# Patient Record
Sex: MALE | URBAN - METROPOLITAN AREA
[De-identification: names, ages, dates, MRNs, and addresses within clinical notes are randomized per-mention and may not be internally consistent; named-entity substitution may affect disease eponyms.]

---

## 2019-09-06 ENCOUNTER — RECORDS - HEALTHEAST (OUTPATIENT)
Dept: LAB | Facility: CLINIC | Age: 44
End: 2019-09-06

## 2019-09-07 LAB
ALBUMIN SERPL-MCNC: 3.6 G/DL (ref 3.5–5)
ALP SERPL-CCNC: 86 U/L (ref 45–120)
ALT SERPL W P-5'-P-CCNC: 12 U/L (ref 0–45)
ANION GAP SERPL CALCULATED.3IONS-SCNC: 8 MMOL/L (ref 5–18)
AST SERPL W P-5'-P-CCNC: 37 U/L (ref 0–40)
BILIRUB SERPL-MCNC: 1 MG/DL (ref 0–1)
BUN SERPL-MCNC: 12 MG/DL (ref 8–22)
CALCIUM SERPL-MCNC: 8.9 MG/DL (ref 8.5–10.5)
CHLORIDE BLD-SCNC: 100 MMOL/L (ref 98–107)
CHOLEST SERPL-MCNC: 174 MG/DL
CO2 SERPL-SCNC: 27 MMOL/L (ref 22–31)
CREAT SERPL-MCNC: 0.97 MG/DL (ref 0.7–1.3)
FASTING STATUS PATIENT QL REPORTED: ABNORMAL
GFR SERPL CREATININE-BSD FRML MDRD: >60 ML/MIN/1.73M2
GLUCOSE BLD-MCNC: 113 MG/DL (ref 70–125)
HDLC SERPL-MCNC: 75 MG/DL
LDLC SERPL CALC-MCNC: 59 MG/DL
POTASSIUM BLD-SCNC: 3.9 MMOL/L (ref 3.5–5)
PROT SERPL-MCNC: 7.1 G/DL (ref 6–8)
SODIUM SERPL-SCNC: 135 MMOL/L (ref 136–145)
TRIGL SERPL-MCNC: 198 MG/DL

## 2020-08-05 ENCOUNTER — RECORDS - HEALTHEAST (OUTPATIENT)
Dept: LAB | Facility: CLINIC | Age: 45
End: 2020-08-05

## 2020-08-05 LAB
ANION GAP SERPL CALCULATED.3IONS-SCNC: 12 MMOL/L (ref 5–18)
BUN SERPL-MCNC: 5 MG/DL (ref 8–22)
CALCIUM SERPL-MCNC: 10.4 MG/DL (ref 8.5–10.5)
CHLORIDE BLD-SCNC: 97 MMOL/L (ref 98–107)
CHOLEST SERPL-MCNC: 279 MG/DL
CO2 SERPL-SCNC: 27 MMOL/L (ref 22–31)
CREAT SERPL-MCNC: 0.8 MG/DL (ref 0.7–1.3)
FASTING STATUS PATIENT QL REPORTED: ABNORMAL
GFR SERPL CREATININE-BSD FRML MDRD: >60 ML/MIN/1.73M2
GLUCOSE BLD-MCNC: 110 MG/DL (ref 70–125)
HDLC SERPL-MCNC: 126 MG/DL
LDLC SERPL CALC-MCNC: 130 MG/DL
POTASSIUM BLD-SCNC: 4.3 MMOL/L (ref 3.5–5)
SODIUM SERPL-SCNC: 136 MMOL/L (ref 136–145)
TRIGL SERPL-MCNC: 115 MG/DL

## 2021-02-24 ENCOUNTER — OFFICE VISIT - HEALTHEAST (OUTPATIENT)
Dept: FAMILY MEDICINE | Facility: CLINIC | Age: 46
End: 2021-02-24

## 2021-02-24 DIAGNOSIS — F32.A DEPRESSION, UNSPECIFIED DEPRESSION TYPE: ICD-10-CM

## 2021-02-24 DIAGNOSIS — Z28.39 IMMUNIZATION DEFICIENCY: ICD-10-CM

## 2021-02-24 ASSESSMENT — MIFFLIN-ST. JEOR: SCORE: 1445.21

## 2021-02-25 ENCOUNTER — RECORDS - HEALTHEAST (OUTPATIENT)
Dept: LAB | Facility: CLINIC | Age: 46
End: 2021-02-25

## 2021-02-26 LAB
ALBUMIN SERPL-MCNC: 4 G/DL (ref 3.5–5)
ALP SERPL-CCNC: 70 U/L (ref 45–120)
ALT SERPL W P-5'-P-CCNC: 17 U/L (ref 0–45)
ANION GAP SERPL CALCULATED.3IONS-SCNC: 8 MMOL/L (ref 5–18)
AST SERPL W P-5'-P-CCNC: 24 U/L (ref 0–40)
BILIRUB DIRECT SERPL-MCNC: <0.1 MG/DL
BILIRUB SERPL-MCNC: 0.3 MG/DL (ref 0–1)
BUN SERPL-MCNC: 8 MG/DL (ref 8–22)
CALCIUM SERPL-MCNC: 9 MG/DL (ref 8.5–10.5)
CHLORIDE BLD-SCNC: 104 MMOL/L (ref 98–107)
CO2 SERPL-SCNC: 28 MMOL/L (ref 22–31)
CREAT SERPL-MCNC: 0.78 MG/DL (ref 0.7–1.3)
GFR SERPL CREATININE-BSD FRML MDRD: >60 ML/MIN/1.73M2
GLUCOSE BLD-MCNC: 95 MG/DL (ref 70–125)
LIPASE SERPL-CCNC: 80 U/L (ref 0–52)
POTASSIUM BLD-SCNC: 4 MMOL/L (ref 3.5–5)
PROT SERPL-MCNC: 7 G/DL (ref 6–8)
SODIUM SERPL-SCNC: 140 MMOL/L (ref 136–145)

## 2021-04-05 ENCOUNTER — RECORDS - HEALTHEAST (OUTPATIENT)
Dept: LAB | Facility: CLINIC | Age: 46
End: 2021-04-05

## 2021-04-07 LAB — BACTERIA SPEC CULT: NORMAL

## 2021-06-05 VITALS
SYSTOLIC BLOOD PRESSURE: 124 MMHG | HEART RATE: 82 BPM | OXYGEN SATURATION: 99 % | BODY MASS INDEX: 22.02 KG/M2 | WEIGHT: 137 LBS | HEIGHT: 66 IN | DIASTOLIC BLOOD PRESSURE: 80 MMHG | RESPIRATION RATE: 16 BRPM | TEMPERATURE: 98.2 F

## 2021-06-15 NOTE — PROGRESS NOTES
ASSESMENT AND PLAN:  Diagnoses and all orders for this visit:    Depression, unspecified depression type  Counseling done with the patient with the help of a professional .  We discussed options, he would like to start fluoxetine and he would like to establish care here at the clinic and follow-up here.  Return in 6 weeks for recheck, sooner if worsening or problems.  -     FLUoxetine (PROZAC) 20 MG capsule; Take 1 capsule (20 mg total) by mouth daily.  Dispense: 30 capsule; Refill: 11    Immunization deficiency  Green Card/update imm.  Counseling done on immunization history and requirements with the patient.  Reviewed available immunization history and relevant lab records with patient and family.  Immunizations given as documented in the EHR and on form.  Acetaminophen as needed for post-immunization fever or myalgias.  [employer] form I-693 completed today with the patient.  Given to patient in a sealed labeled envelope and a copy is being scanned into the EHR.  Please see that form for further details.  Patient directed to follow-up in clinic for routine and preventative care.         Reviewed the risks and benefits of the treatment plan with the patient and/or caregiver and we discussed indications for routine and emergent follow-up.        SUBJECTIVE: Patient is new.  Previously he had apparently gotten refugee screening done at [facility].  Has not been in for recent follow-up or doctor appointments.  He is here for green card exam today but reports that he has been struggling with depression.  Patient reports increased anxiety and depressed mood over the past several months.  It is made it more difficult for him to enjoy things in his daily life and look forward to things and he feels less motivated.  He is more easily made to feel anxious by stressful things.  No recent fevers.  No history of immunization reactions or problems.    No past medical history on  "file.  There is no problem list on file for this patient.    Current Outpatient Medications   Medication Sig Dispense Refill     FLUoxetine (PROZAC) 20 MG capsule Take 1 capsule (20 mg total) by mouth daily. 30 capsule 11     No current facility-administered medications for this visit.      Social History     Tobacco Use   Smoking Status Current Every Day Smoker     Packs/day: 0.25   Smokeless Tobacco Never Used       OBJECTICE: /80 (Patient Site: Left Arm)   Pulse 82   Temp 98.2  F (36.8  C) (Oral)   Resp 16   Ht 5' 5.75\" (1.67 m)   Wt 137 lb (62.1 kg)   SpO2 99%   BMI 22.28 kg/m       No results found for this or any previous visit (from the past 24 hour(s)).     GEN-Alert, appropriate, in no apparent distress   CV-regular rate and rhythm with no murmur   RESP-lungs clear to auscultation   Psychiatric-appearance well-groomed, speech of normal fluency and rate, mood depressed, affect flat, thought content negative for suicidal or homicidal ideation, thought processing negative for paranoid or delusional thinking.    Tadeo Forrester          "

## 2022-01-01 ENCOUNTER — APPOINTMENT (OUTPATIENT)
Dept: CARDIOLOGY | Facility: CLINIC | Age: 47
End: 2022-01-01
Attending: NEUROLOGICAL SURGERY
Payer: COMMERCIAL

## 2022-01-01 ENCOUNTER — APPOINTMENT (OUTPATIENT)
Dept: CT IMAGING | Facility: CLINIC | Age: 47
End: 2022-01-01
Attending: NEUROLOGICAL SURGERY
Payer: COMMERCIAL

## 2022-01-01 ENCOUNTER — ANESTHESIA EVENT (OUTPATIENT)
Dept: SURGERY | Facility: CLINIC | Age: 47
End: 2022-01-01
Payer: COMMERCIAL

## 2022-01-01 ENCOUNTER — HOSPITAL ENCOUNTER (EMERGENCY)
Facility: HOSPITAL | Age: 47
Discharge: ANOTHER HOSPITAL | End: 2022-07-06
Attending: EMERGENCY MEDICINE | Admitting: EMERGENCY MEDICINE
Payer: COMMERCIAL

## 2022-01-01 ENCOUNTER — HOSPITAL ENCOUNTER (INPATIENT)
Facility: CLINIC | Age: 47
LOS: 1 days | End: 2022-07-07
Attending: NEUROLOGICAL SURGERY | Admitting: NEUROLOGICAL SURGERY
Payer: COMMERCIAL

## 2022-01-01 ENCOUNTER — APPOINTMENT (OUTPATIENT)
Dept: CT IMAGING | Facility: HOSPITAL | Age: 47
End: 2022-01-01
Attending: EMERGENCY MEDICINE
Payer: COMMERCIAL

## 2022-01-01 ENCOUNTER — APPOINTMENT (OUTPATIENT)
Dept: GENERAL RADIOLOGY | Facility: CLINIC | Age: 47
End: 2022-01-01
Attending: NEUROLOGICAL SURGERY
Payer: COMMERCIAL

## 2022-01-01 ENCOUNTER — ANESTHESIA (OUTPATIENT)
Dept: SURGERY | Facility: CLINIC | Age: 47
End: 2022-01-01
Payer: COMMERCIAL

## 2022-01-01 VITALS
TEMPERATURE: 100.7 F | SYSTOLIC BLOOD PRESSURE: 120 MMHG | HEIGHT: 65 IN | WEIGHT: 125.22 LBS | OXYGEN SATURATION: 100 % | HEART RATE: 117 BPM | DIASTOLIC BLOOD PRESSURE: 89 MMHG | BODY MASS INDEX: 20.86 KG/M2 | RESPIRATION RATE: 20 BRPM

## 2022-01-01 VITALS
DIASTOLIC BLOOD PRESSURE: 104 MMHG | SYSTOLIC BLOOD PRESSURE: 189 MMHG | OXYGEN SATURATION: 100 % | WEIGHT: 132.28 LBS | HEART RATE: 91 BPM | BODY MASS INDEX: 21.51 KG/M2 | RESPIRATION RATE: 16 BRPM

## 2022-01-01 DIAGNOSIS — I62.9 INTRACRANIAL HEMORRHAGE (H): ICD-10-CM

## 2022-01-01 LAB
ABO/RH(D): ABNORMAL
ALBUMIN SERPL BCG-MCNC: 4.3 G/DL (ref 3.5–5.2)
ALBUMIN UR-MCNC: NEGATIVE MG/DL
ALP SERPL-CCNC: 94 U/L (ref 40–129)
ALT SERPL W P-5'-P-CCNC: 45 U/L (ref 10–50)
ANION GAP SERPL CALCULATED.3IONS-SCNC: 12 MMOL/L (ref 7–15)
ANION GAP SERPL CALCULATED.3IONS-SCNC: 21 MMOL/L (ref 5–18)
ANION GAP SERPL CALCULATED.3IONS-SCNC: 22 MMOL/L (ref 7–15)
ANTIBODY ID: NORMAL
ANTIBODY SCREEN: POSITIVE
APPEARANCE UR: CLEAR
APTT PPP: 27 SECONDS (ref 22–38)
AST SERPL W P-5'-P-CCNC: 236 U/L (ref 10–50)
ATRIAL RATE - MUSE: 100 BPM
BASE EXCESS BLDA CALC-SCNC: -0.9 MMOL/L (ref -9–1.8)
BASOPHILS # BLD AUTO: 0.1 10E3/UL (ref 0–0.2)
BASOPHILS NFR BLD AUTO: 1 %
BILIRUB DIRECT SERPL-MCNC: 0.67 MG/DL (ref 0–0.3)
BILIRUB SERPL-MCNC: 1.5 MG/DL
BILIRUB UR QL STRIP: NEGATIVE
BUN SERPL-MCNC: 5.6 MG/DL (ref 6–20)
BUN SERPL-MCNC: 5.7 MG/DL (ref 6–20)
BUN SERPL-MCNC: 6 MG/DL (ref 8–22)
CALCIUM SERPL-MCNC: 8.5 MG/DL (ref 8.6–10)
CALCIUM SERPL-MCNC: 8.7 MG/DL (ref 8.5–10.5)
CALCIUM SERPL-MCNC: 8.9 MG/DL (ref 8.6–10)
CHLORIDE BLD-SCNC: 94 MMOL/L (ref 98–107)
CHLORIDE SERPL-SCNC: 93 MMOL/L (ref 98–107)
CHLORIDE SERPL-SCNC: 97 MMOL/L (ref 98–107)
CK SERPL-CCNC: 354 U/L (ref 39–308)
CO2 SERPL-SCNC: 21 MMOL/L (ref 22–31)
COLOR UR AUTO: ABNORMAL
CREAT SERPL-MCNC: 0.63 MG/DL (ref 0.67–1.17)
CREAT SERPL-MCNC: 0.64 MG/DL (ref 0.67–1.17)
CREAT SERPL-MCNC: 0.69 MG/DL (ref 0.7–1.3)
DEPRECATED HCO3 PLAS-SCNC: 21 MMOL/L (ref 22–29)
DEPRECATED HCO3 PLAS-SCNC: 27 MMOL/L (ref 22–29)
DIASTOLIC BLOOD PRESSURE - MUSE: NORMAL MMHG
EOSINOPHIL # BLD AUTO: 0.1 10E3/UL (ref 0–0.7)
EOSINOPHIL NFR BLD AUTO: 1 %
ERYTHROCYTE [DISTWIDTH] IN BLOOD BY AUTOMATED COUNT: 14.7 % (ref 10–15)
ERYTHROCYTE [DISTWIDTH] IN BLOOD BY AUTOMATED COUNT: 14.7 % (ref 10–15)
GFR SERPL CREATININE-BSD FRML MDRD: >90 ML/MIN/1.73M2
GLUCOSE BLD-MCNC: 154 MG/DL (ref 70–125)
GLUCOSE BLDC GLUCOMTR-MCNC: 146 MG/DL (ref 70–99)
GLUCOSE BLDC GLUCOMTR-MCNC: 155 MG/DL (ref 70–99)
GLUCOSE SERPL-MCNC: 148 MG/DL (ref 70–99)
GLUCOSE SERPL-MCNC: 156 MG/DL (ref 70–99)
GLUCOSE UR STRIP-MCNC: NEGATIVE MG/DL
HCO3 BLD-SCNC: 25 MMOL/L (ref 21–28)
HCT VFR BLD AUTO: 35.7 % (ref 40–53)
HCT VFR BLD AUTO: 37.8 % (ref 40–53)
HGB BLD-MCNC: 12.4 G/DL (ref 13.3–17.7)
HGB BLD-MCNC: 13.2 G/DL (ref 13.3–17.7)
HGB UR QL STRIP: NEGATIVE
HOLD SPECIMEN: NORMAL
IMM GRANULOCYTES # BLD: 0 10E3/UL
IMM GRANULOCYTES NFR BLD: 1 %
INR PPP: 1.15 (ref 0.85–1.15)
INTERPRETATION ECG - MUSE: NORMAL
KETONES UR STRIP-MCNC: 10 MG/DL
LACTATE SERPL-SCNC: 6.2 MMOL/L (ref 0.7–2)
LEUKOCYTE ESTERASE UR QL STRIP: NEGATIVE
LVEF ECHO: NORMAL
LYMPHOCYTES # BLD AUTO: 2.5 10E3/UL (ref 0.8–5.3)
LYMPHOCYTES NFR BLD AUTO: 30 %
MAGNESIUM SERPL-MCNC: 1.3 MG/DL (ref 1.7–2.3)
MCH RBC QN AUTO: 33.3 PG (ref 26.5–33)
MCH RBC QN AUTO: 33.9 PG (ref 26.5–33)
MCHC RBC AUTO-ENTMCNC: 34.7 G/DL (ref 31.5–36.5)
MCHC RBC AUTO-ENTMCNC: 34.9 G/DL (ref 31.5–36.5)
MCV RBC AUTO: 96 FL (ref 78–100)
MCV RBC AUTO: 97 FL (ref 78–100)
MONOCYTES # BLD AUTO: 0.8 10E3/UL (ref 0–1.3)
MONOCYTES NFR BLD AUTO: 9 %
N AG RBC QL: NEGATIVE
NEUTROPHILS # BLD AUTO: 4.9 10E3/UL (ref 1.6–8.3)
NEUTROPHILS NFR BLD AUTO: 58 %
NITRATE UR QL: NEGATIVE
NRBC # BLD AUTO: 0 10E3/UL
NRBC BLD AUTO-RTO: 0 /100
O2/TOTAL GAS SETTING VFR VENT: 30 %
OXYHGB MFR BLD: 98 % (ref 92–100)
P AXIS - MUSE: 70 DEGREES
PCO2 BLD: 42 MM HG (ref 35–45)
PH BLD: 7.37 [PH] (ref 7.35–7.45)
PH UR STRIP: 6.5 [PH] (ref 5–7)
PHOSPHATE SERPL-MCNC: 3 MG/DL (ref 2.5–4.5)
PLATELET # BLD AUTO: 64 10E3/UL (ref 150–450)
PLATELET # BLD AUTO: 65 10E3/UL (ref 150–450)
PO2 BLD: 151 MM HG (ref 80–105)
POTASSIUM BLD-SCNC: 3.6 MMOL/L (ref 3.5–5)
POTASSIUM SERPL-SCNC: 3.3 MMOL/L (ref 3.4–5.3)
POTASSIUM SERPL-SCNC: 4.3 MMOL/L (ref 3.4–5.3)
PR INTERVAL - MUSE: 122 MS
PROT SERPL-MCNC: 6.9 G/DL (ref 6.4–8.3)
QRS DURATION - MUSE: 112 MS
QT - MUSE: 388 MS
QTC - MUSE: 500 MS
R AXIS - MUSE: 75 DEGREES
RBC # BLD AUTO: 3.72 10E6/UL (ref 4.4–5.9)
RBC # BLD AUTO: 3.89 10E6/UL (ref 4.4–5.9)
SODIUM SERPL-SCNC: 136 MMOL/L (ref 136–145)
SP GR UR STRIP: 1.03 (ref 1–1.03)
SPECIMEN EXPIRATION DATE: ABNORMAL
SPECIMEN EXPIRATION DATE: NORMAL
SPECIMEN EXPIRATION DATE: NORMAL
SYSTOLIC BLOOD PRESSURE - MUSE: NORMAL MMHG
T AXIS - MUSE: 210 DEGREES
TROPONIN I SERPL-MCNC: <0.01 NG/ML (ref 0–0.29)
TROPONIN T SERPL HS-MCNC: 8 NG/L
UROBILINOGEN UR STRIP-MCNC: NORMAL MG/DL
VENTRICULAR RATE- MUSE: 100 BPM
WBC # BLD AUTO: 8.4 10E3/UL (ref 4–11)
WBC # BLD AUTO: 9.5 10E3/UL (ref 4–11)

## 2022-01-01 PROCEDURE — 360N000078 HC SURGERY LEVEL 5, PER MIN: Performed by: NEUROLOGICAL SURGERY

## 2022-01-01 PROCEDURE — 94002 VENT MGMT INPAT INIT DAY: CPT

## 2022-01-01 PROCEDURE — 93306 TTE W/DOPPLER COMPLETE: CPT | Mod: 26 | Performed by: STUDENT IN AN ORGANIZED HEALTH CARE EDUCATION/TRAINING PROGRAM

## 2022-01-01 PROCEDURE — 70450 CT HEAD/BRAIN W/O DYE: CPT

## 2022-01-01 PROCEDURE — 81003 URINALYSIS AUTO W/O SCOPE: CPT

## 2022-01-01 PROCEDURE — 250N000009 HC RX 250: Performed by: EMERGENCY MEDICINE

## 2022-01-01 PROCEDURE — 85027 COMPLETE CBC AUTOMATED: CPT

## 2022-01-01 PROCEDURE — 82805 BLOOD GASES W/O2 SATURATION: CPT

## 2022-01-01 PROCEDURE — 250N000011 HC RX IP 250 OP 636

## 2022-01-01 PROCEDURE — 999N000065 XR CHEST PORT 1 VIEW

## 2022-01-01 PROCEDURE — 36415 COLL VENOUS BLD VENIPUNCTURE: CPT | Performed by: REGISTERED NURSE

## 2022-01-01 PROCEDURE — 99291 CRITICAL CARE FIRST HOUR: CPT | Performed by: NURSE PRACTITIONER

## 2022-01-01 PROCEDURE — 370N000017 HC ANESTHESIA TECHNICAL FEE, PER MIN: Performed by: NEUROLOGICAL SURGERY

## 2022-01-01 PROCEDURE — 94003 VENT MGMT INPAT SUBQ DAY: CPT

## 2022-01-01 PROCEDURE — 61210 BURR HOLE IMPLT VENTR CATH: CPT | Mod: GC | Performed by: NEUROLOGICAL SURGERY

## 2022-01-01 PROCEDURE — 999N000158 HC STATISTIC RCP TIME ED VENT EA 10 MIN

## 2022-01-01 PROCEDURE — 96365 THER/PROPH/DIAG IV INF INIT: CPT | Mod: 59

## 2022-01-01 PROCEDURE — 999N000076 HC STATISTIC ICP MONITORING

## 2022-01-01 PROCEDURE — 250N000011 HC RX IP 250 OP 636: Performed by: REGISTERED NURSE

## 2022-01-01 PROCEDURE — 250N000009 HC RX 250: Performed by: REGISTERED NURSE

## 2022-01-01 PROCEDURE — 009630Z DRAINAGE OF CEREBRAL VENTRICLE WITH DRAINAGE DEVICE, PERCUTANEOUS APPROACH: ICD-10-PCS | Performed by: NEUROLOGICAL SURGERY

## 2022-01-01 PROCEDURE — 85610 PROTHROMBIN TIME: CPT | Performed by: EMERGENCY MEDICINE

## 2022-01-01 PROCEDURE — 70496 CT ANGIOGRAPHY HEAD: CPT

## 2022-01-01 PROCEDURE — 85025 COMPLETE CBC W/AUTO DIFF WBC: CPT | Performed by: EMERGENCY MEDICINE

## 2022-01-01 PROCEDURE — 84100 ASSAY OF PHOSPHORUS: CPT

## 2022-01-01 PROCEDURE — 80053 COMPREHEN METABOLIC PANEL: CPT | Performed by: EMERGENCY MEDICINE

## 2022-01-01 PROCEDURE — 250N000011 HC RX IP 250 OP 636: Performed by: NURSE PRACTITIONER

## 2022-01-01 PROCEDURE — 250N000011 HC RX IP 250 OP 636: Performed by: NURSE ANESTHETIST, CERTIFIED REGISTERED

## 2022-01-01 PROCEDURE — 82248 BILIRUBIN DIRECT: CPT

## 2022-01-01 PROCEDURE — 86905 BLOOD TYPING RBC ANTIGENS: CPT | Performed by: REGISTERED NURSE

## 2022-01-01 PROCEDURE — 36415 COLL VENOUS BLD VENIPUNCTURE: CPT | Performed by: EMERGENCY MEDICINE

## 2022-01-01 PROCEDURE — 250N000009 HC RX 250: Performed by: NEUROLOGICAL SURGERY

## 2022-01-01 PROCEDURE — 272N000001 HC OR GENERAL SUPPLY STERILE: Performed by: NEUROLOGICAL SURGERY

## 2022-01-01 PROCEDURE — 84484 ASSAY OF TROPONIN QUANT: CPT

## 2022-01-01 PROCEDURE — 96368 THER/DIAG CONCURRENT INF: CPT

## 2022-01-01 PROCEDURE — 5A1935Z RESPIRATORY VENTILATION, LESS THAN 24 CONSECUTIVE HOURS: ICD-10-PCS | Performed by: NEUROLOGICAL SURGERY

## 2022-01-01 PROCEDURE — 70450 CT HEAD/BRAIN W/O DYE: CPT | Mod: 26 | Performed by: STUDENT IN AN ORGANIZED HEALTH CARE EDUCATION/TRAINING PROGRAM

## 2022-01-01 PROCEDURE — 250N000013 HC RX MED GY IP 250 OP 250 PS 637: Performed by: INTERNAL MEDICINE

## 2022-01-01 PROCEDURE — 250N000011 HC RX IP 250 OP 636: Performed by: EMERGENCY MEDICINE

## 2022-01-01 PROCEDURE — 99222 1ST HOSP IP/OBS MODERATE 55: CPT | Mod: GC | Performed by: INTERNAL MEDICINE

## 2022-01-01 PROCEDURE — 999N000157 HC STATISTIC RCP TIME EA 10 MIN

## 2022-01-01 PROCEDURE — 85730 THROMBOPLASTIN TIME PARTIAL: CPT | Performed by: EMERGENCY MEDICINE

## 2022-01-01 PROCEDURE — 83605 ASSAY OF LACTIC ACID: CPT | Performed by: ANESTHESIOLOGY

## 2022-01-01 PROCEDURE — 99285 EMERGENCY DEPT VISIT HI MDM: CPT | Mod: 25

## 2022-01-01 PROCEDURE — 82550 ASSAY OF CK (CPK): CPT

## 2022-01-01 PROCEDURE — 93306 TTE W/DOPPLER COMPLETE: CPT

## 2022-01-01 PROCEDURE — 250N000011 HC RX IP 250 OP 636: Performed by: ANESTHESIOLOGY

## 2022-01-01 PROCEDURE — 93010 ELECTROCARDIOGRAM REPORT: CPT | Performed by: INTERNAL MEDICINE

## 2022-01-01 PROCEDURE — 258N000003 HC RX IP 258 OP 636: Performed by: REGISTERED NURSE

## 2022-01-01 PROCEDURE — 250N000011 HC RX IP 250 OP 636: Performed by: PSYCHIATRY & NEUROLOGY

## 2022-01-01 PROCEDURE — 200N000002 HC R&B ICU UMMC

## 2022-01-01 PROCEDURE — 84484 ASSAY OF TROPONIN QUANT: CPT | Performed by: EMERGENCY MEDICINE

## 2022-01-01 PROCEDURE — 31500 INSERT EMERGENCY AIRWAY: CPT

## 2022-01-01 PROCEDURE — 258N000003 HC RX IP 258 OP 636: Performed by: EMERGENCY MEDICINE

## 2022-01-01 PROCEDURE — 86870 RBC ANTIBODY IDENTIFICATION: CPT | Performed by: REGISTERED NURSE

## 2022-01-01 PROCEDURE — 86901 BLOOD TYPING SEROLOGIC RH(D): CPT | Performed by: REGISTERED NURSE

## 2022-01-01 PROCEDURE — 80048 BASIC METABOLIC PNL TOTAL CA: CPT | Performed by: NEUROLOGICAL SURGERY

## 2022-01-01 PROCEDURE — 250N000025 HC SEVOFLURANE, PER MIN: Performed by: NEUROLOGICAL SURGERY

## 2022-01-01 PROCEDURE — 93005 ELECTROCARDIOGRAM TRACING: CPT

## 2022-01-01 PROCEDURE — 999N000015 HC STATISTIC ARTERIAL MONITORING DAILY

## 2022-01-01 PROCEDURE — 83735 ASSAY OF MAGNESIUM: CPT

## 2022-01-01 PROCEDURE — 71045 X-RAY EXAM CHEST 1 VIEW: CPT | Mod: 26 | Performed by: RADIOLOGY

## 2022-01-01 PROCEDURE — 250N000009 HC RX 250

## 2022-01-01 RX ORDER — FENTANYL CITRATE 50 UG/ML
50 INJECTION, SOLUTION INTRAMUSCULAR; INTRAVENOUS EVERY 30 MIN PRN
Status: DISCONTINUED | OUTPATIENT
Start: 2022-01-01 | End: 2022-01-01

## 2022-01-01 RX ORDER — SODIUM CHLORIDE, SODIUM GLUCONATE, SODIUM ACETATE, POTASSIUM CHLORIDE AND MAGNESIUM CHLORIDE 526; 502; 368; 37; 30 MG/100ML; MG/100ML; MG/100ML; MG/100ML; MG/100ML
125 INJECTION, SOLUTION INTRAVENOUS CONTINUOUS
Status: DISCONTINUED | OUTPATIENT
Start: 2022-01-01 | End: 2022-01-01

## 2022-01-01 RX ORDER — LABETALOL HYDROCHLORIDE 5 MG/ML
10-40 INJECTION, SOLUTION INTRAVENOUS EVERY 10 MIN PRN
Status: DISCONTINUED | OUTPATIENT
Start: 2022-01-01 | End: 2022-01-01 | Stop reason: HOSPADM

## 2022-01-01 RX ORDER — MORPHINE SULFATE 20 MG/ML
5 SOLUTION ORAL EVERY 4 HOURS
Status: DISCONTINUED | OUTPATIENT
Start: 2022-01-01 | End: 2022-01-01 | Stop reason: HOSPADM

## 2022-01-01 RX ORDER — ONDANSETRON 2 MG/ML
INJECTION INTRAMUSCULAR; INTRAVENOUS PRN
Status: DISCONTINUED | OUTPATIENT
Start: 2022-01-01 | End: 2022-01-01

## 2022-01-01 RX ORDER — MANNITOL 20 G/100ML
INJECTION, SOLUTION INTRAVENOUS PRN
Status: DISCONTINUED | OUTPATIENT
Start: 2022-01-01 | End: 2022-01-01

## 2022-01-01 RX ORDER — ONDANSETRON 2 MG/ML
4 INJECTION INTRAMUSCULAR; INTRAVENOUS EVERY 6 HOURS PRN
Status: DISCONTINUED | OUTPATIENT
Start: 2022-01-01 | End: 2022-01-01 | Stop reason: HOSPADM

## 2022-01-01 RX ORDER — NALOXONE HYDROCHLORIDE 0.4 MG/ML
0.2 INJECTION, SOLUTION INTRAMUSCULAR; INTRAVENOUS; SUBCUTANEOUS
Status: DISCONTINUED | OUTPATIENT
Start: 2022-01-01 | End: 2022-01-01 | Stop reason: HOSPADM

## 2022-01-01 RX ORDER — LORAZEPAM 2 MG/ML
1-2 CONCENTRATE ORAL
Status: DISCONTINUED | OUTPATIENT
Start: 2022-01-01 | End: 2022-01-01 | Stop reason: HOSPADM

## 2022-01-01 RX ORDER — FLUMAZENIL 0.1 MG/ML
0.2 INJECTION, SOLUTION INTRAVENOUS
Status: DISCONTINUED | OUTPATIENT
Start: 2022-01-01 | End: 2022-01-01 | Stop reason: HOSPADM

## 2022-01-01 RX ORDER — HYDROMORPHONE HCL IN WATER/PF 6 MG/30 ML
0.2 PATIENT CONTROLLED ANALGESIA SYRINGE INTRAVENOUS
Status: DISCONTINUED | OUTPATIENT
Start: 2022-01-01 | End: 2022-01-01

## 2022-01-01 RX ORDER — CEFAZOLIN SODIUM/WATER 2 G/20 ML
SYRINGE (ML) INTRAVENOUS PRN
Status: DISCONTINUED | OUTPATIENT
Start: 2022-01-01 | End: 2022-01-01

## 2022-01-01 RX ORDER — IOPAMIDOL 755 MG/ML
75 INJECTION, SOLUTION INTRAVASCULAR ONCE
Status: COMPLETED | OUTPATIENT
Start: 2022-01-01 | End: 2022-01-01

## 2022-01-01 RX ORDER — PROCHLORPERAZINE MALEATE 10 MG
10 TABLET ORAL EVERY 6 HOURS PRN
Status: DISCONTINUED | OUTPATIENT
Start: 2022-01-01 | End: 2022-01-01 | Stop reason: HOSPADM

## 2022-01-01 RX ORDER — NALOXONE HYDROCHLORIDE 0.4 MG/ML
0.4 INJECTION, SOLUTION INTRAMUSCULAR; INTRAVENOUS; SUBCUTANEOUS
Status: DISCONTINUED | OUTPATIENT
Start: 2022-01-01 | End: 2022-01-01

## 2022-01-01 RX ORDER — ACETAMINOPHEN 325 MG/1
975 TABLET ORAL EVERY 8 HOURS
Status: DISCONTINUED | OUTPATIENT
Start: 2022-01-01 | End: 2022-01-01

## 2022-01-01 RX ORDER — SENNOSIDES 8.6 MG
8.6 TABLET ORAL 2 TIMES DAILY PRN
Status: DISCONTINUED | OUTPATIENT
Start: 2022-01-01 | End: 2022-01-01 | Stop reason: HOSPADM

## 2022-01-01 RX ORDER — HYDROMORPHONE HCL IN WATER/PF 6 MG/30 ML
0.4 PATIENT CONTROLLED ANALGESIA SYRINGE INTRAVENOUS
Status: DISCONTINUED | OUTPATIENT
Start: 2022-01-01 | End: 2022-01-01

## 2022-01-01 RX ORDER — LIDOCAINE 40 MG/G
CREAM TOPICAL
Status: DISCONTINUED | OUTPATIENT
Start: 2022-01-01 | End: 2022-01-01 | Stop reason: HOSPADM

## 2022-01-01 RX ORDER — MORPHINE SULFATE 2 MG/ML
2-4 INJECTION, SOLUTION INTRAMUSCULAR; INTRAVENOUS
Status: DISCONTINUED | OUTPATIENT
Start: 2022-01-01 | End: 2022-01-01

## 2022-01-01 RX ORDER — POLYETHYLENE GLYCOL 3350 17 G/17G
17 POWDER, FOR SOLUTION ORAL DAILY
Status: DISCONTINUED | OUTPATIENT
Start: 2022-01-01 | End: 2022-01-01

## 2022-01-01 RX ORDER — LIDOCAINE HYDROCHLORIDE AND EPINEPHRINE 10; 10 MG/ML; UG/ML
INJECTION, SOLUTION INFILTRATION; PERINEURAL PRN
Status: DISCONTINUED | OUTPATIENT
Start: 2022-01-01 | End: 2022-01-01 | Stop reason: HOSPADM

## 2022-01-01 RX ORDER — PROPOFOL 10 MG/ML
INJECTION, EMULSION INTRAVENOUS CONTINUOUS PRN
Status: DISCONTINUED | OUTPATIENT
Start: 2022-01-01 | End: 2022-01-01

## 2022-01-01 RX ORDER — NALOXONE HYDROCHLORIDE 0.4 MG/ML
0.2 INJECTION, SOLUTION INTRAMUSCULAR; INTRAVENOUS; SUBCUTANEOUS
Status: DISCONTINUED | OUTPATIENT
Start: 2022-01-01 | End: 2022-01-01

## 2022-01-01 RX ORDER — FAMOTIDINE 20 MG/1
20 TABLET, FILM COATED ORAL 2 TIMES DAILY
Status: DISCONTINUED | OUTPATIENT
Start: 2022-01-01 | End: 2022-01-01

## 2022-01-01 RX ORDER — HYDRALAZINE HYDROCHLORIDE 20 MG/ML
10-20 INJECTION INTRAMUSCULAR; INTRAVENOUS EVERY 30 MIN PRN
Status: DISCONTINUED | OUTPATIENT
Start: 2022-01-01 | End: 2022-01-01 | Stop reason: HOSPADM

## 2022-01-01 RX ORDER — NALOXONE HYDROCHLORIDE 0.4 MG/ML
0.4 INJECTION, SOLUTION INTRAMUSCULAR; INTRAVENOUS; SUBCUTANEOUS
Status: DISCONTINUED | OUTPATIENT
Start: 2022-01-01 | End: 2022-01-01 | Stop reason: HOSPADM

## 2022-01-01 RX ORDER — PROPOFOL 10 MG/ML
5-75 INJECTION, EMULSION INTRAVENOUS CONTINUOUS
Status: DISCONTINUED | OUTPATIENT
Start: 2022-01-01 | End: 2022-01-01 | Stop reason: HOSPADM

## 2022-01-01 RX ORDER — MORPHINE SULFATE 20 MG/ML
5-10 SOLUTION ORAL
Status: DISCONTINUED | OUTPATIENT
Start: 2022-01-01 | End: 2022-01-01

## 2022-01-01 RX ORDER — FENTANYL CITRATE 50 UG/ML
INJECTION, SOLUTION INTRAMUSCULAR; INTRAVENOUS PRN
Status: DISCONTINUED | OUTPATIENT
Start: 2022-01-01 | End: 2022-01-01

## 2022-01-01 RX ORDER — ETOMIDATE 2 MG/ML
20 INJECTION INTRAVENOUS ONCE
Status: COMPLETED | OUTPATIENT
Start: 2022-01-01 | End: 2022-01-01

## 2022-01-01 RX ORDER — MORPHINE SULFATE 2 MG/ML
2-4 INJECTION, SOLUTION INTRAMUSCULAR; INTRAVENOUS
Status: DISCONTINUED | OUTPATIENT
Start: 2022-01-01 | End: 2022-01-01 | Stop reason: HOSPADM

## 2022-01-01 RX ORDER — MORPHINE SULFATE 20 MG/ML
5-10 SOLUTION ORAL
Status: DISCONTINUED | OUTPATIENT
Start: 2022-01-01 | End: 2022-01-01 | Stop reason: HOSPADM

## 2022-01-01 RX ORDER — LORAZEPAM 2 MG/ML
1-2 INJECTION INTRAMUSCULAR
Status: DISCONTINUED | OUTPATIENT
Start: 2022-01-01 | End: 2022-01-01

## 2022-01-01 RX ORDER — LORAZEPAM 2 MG/ML
1-2 CONCENTRATE ORAL
Status: DISCONTINUED | OUTPATIENT
Start: 2022-01-01 | End: 2022-01-01

## 2022-01-01 RX ORDER — AMOXICILLIN 250 MG
1 CAPSULE ORAL 2 TIMES DAILY
Status: DISCONTINUED | OUTPATIENT
Start: 2022-01-01 | End: 2022-01-01

## 2022-01-01 RX ORDER — ACETAMINOPHEN 325 MG/1
650 TABLET ORAL EVERY 4 HOURS PRN
Status: DISCONTINUED | OUTPATIENT
Start: 2022-07-09 | End: 2022-01-01 | Stop reason: HOSPADM

## 2022-01-01 RX ORDER — PROPOFOL 10 MG/ML
5-75 INJECTION, EMULSION INTRAVENOUS CONTINUOUS
Status: DISCONTINUED | OUTPATIENT
Start: 2022-01-01 | End: 2022-01-01

## 2022-01-01 RX ORDER — PROPOFOL 10 MG/ML
INJECTION, EMULSION INTRAVENOUS PRN
Status: DISCONTINUED | OUTPATIENT
Start: 2022-01-01 | End: 2022-01-01

## 2022-01-01 RX ORDER — SODIUM CHLORIDE, SODIUM LACTATE, POTASSIUM CHLORIDE, CALCIUM CHLORIDE 600; 310; 30; 20 MG/100ML; MG/100ML; MG/100ML; MG/100ML
INJECTION, SOLUTION INTRAVENOUS CONTINUOUS PRN
Status: DISCONTINUED | OUTPATIENT
Start: 2022-01-01 | End: 2022-01-01

## 2022-01-01 RX ORDER — ONDANSETRON 4 MG/1
4 TABLET, ORALLY DISINTEGRATING ORAL EVERY 6 HOURS PRN
Status: DISCONTINUED | OUTPATIENT
Start: 2022-01-01 | End: 2022-01-01 | Stop reason: HOSPADM

## 2022-01-01 RX ORDER — FENTANYL CITRATE 50 UG/ML
50 INJECTION, SOLUTION INTRAMUSCULAR; INTRAVENOUS ONCE
Status: COMPLETED | OUTPATIENT
Start: 2022-01-01 | End: 2022-01-01

## 2022-01-01 RX ORDER — FENTANYL CITRATE 50 UG/ML
50 INJECTION, SOLUTION INTRAMUSCULAR; INTRAVENOUS EVERY 10 MIN PRN
Status: DISCONTINUED | OUTPATIENT
Start: 2022-01-01 | End: 2022-01-01

## 2022-01-01 RX ORDER — SODIUM CHLORIDE 9 MG/ML
INJECTION, SOLUTION INTRAVENOUS CONTINUOUS
Status: DISCONTINUED | OUTPATIENT
Start: 2022-01-01 | End: 2022-01-01

## 2022-01-01 RX ORDER — BISACODYL 10 MG
10 SUPPOSITORY, RECTAL RECTAL DAILY PRN
Status: DISCONTINUED | OUTPATIENT
Start: 2022-01-01 | End: 2022-01-01 | Stop reason: HOSPADM

## 2022-01-01 RX ADMIN — HYDROMORPHONE HYDROCHLORIDE 0.4 MG: 0.2 INJECTION, SOLUTION INTRAMUSCULAR; INTRAVENOUS; SUBCUTANEOUS at 08:33

## 2022-01-01 RX ADMIN — LEVETIRACETAM 1000 MG: 100 INJECTION, SOLUTION INTRAVENOUS at 14:07

## 2022-01-01 RX ADMIN — SODIUM CHLORIDE, SODIUM GLUCONATE, SODIUM ACETATE, POTASSIUM CHLORIDE AND MAGNESIUM CHLORIDE 125 ML: 526; 502; 368; 37; 30 INJECTION, SOLUTION INTRAVENOUS at 20:59

## 2022-01-01 RX ADMIN — Medication 2 G: at 15:17

## 2022-01-01 RX ADMIN — Medication 50 MG: at 15:08

## 2022-01-01 RX ADMIN — FENTANYL CITRATE 50 MCG: 50 INJECTION, SOLUTION INTRAMUSCULAR; INTRAVENOUS at 14:55

## 2022-01-01 RX ADMIN — ROCURONIUM BROMIDE 80 MG: 10 INJECTION, SOLUTION INTRAVENOUS at 13:45

## 2022-01-01 RX ADMIN — MIDAZOLAM HYDROCHLORIDE 5 MG: 1 INJECTION, SOLUTION INTRAMUSCULAR; INTRAVENOUS at 10:46

## 2022-01-01 RX ADMIN — NICARDIPINE HYDROCHLORIDE 12.5 MG/HR: 0.2 INJECTION, SOLUTION INTRAVENOUS at 15:00

## 2022-01-01 RX ADMIN — FENTANYL CITRATE 50 MCG: 50 INJECTION INTRAMUSCULAR; INTRAVENOUS at 10:46

## 2022-01-01 RX ADMIN — MIDAZOLAM HYDROCHLORIDE 5 MG: 1 INJECTION, SOLUTION INTRAMUSCULAR; INTRAVENOUS at 10:36

## 2022-01-01 RX ADMIN — MIDAZOLAM HYDROCHLORIDE 5 MG: 1 INJECTION, SOLUTION INTRAMUSCULAR; INTRAVENOUS at 11:20

## 2022-01-01 RX ADMIN — FAMOTIDINE 20 MG: 20 INJECTION, SOLUTION INTRAVENOUS at 07:51

## 2022-01-01 RX ADMIN — FENTANYL CITRATE 50 MCG: 50 INJECTION INTRAMUSCULAR; INTRAVENOUS at 11:04

## 2022-01-01 RX ADMIN — MIDAZOLAM HYDROCHLORIDE 5 MG: 1 INJECTION, SOLUTION INTRAMUSCULAR; INTRAVENOUS at 12:56

## 2022-01-01 RX ADMIN — ONDANSETRON 4 MG: 2 INJECTION INTRAMUSCULAR; INTRAVENOUS at 15:49

## 2022-01-01 RX ADMIN — PROPOFOL 175 MCG/KG/MIN: 10 INJECTION, EMULSION INTRAVENOUS at 15:15

## 2022-01-01 RX ADMIN — IOPAMIDOL 75 ML: 755 INJECTION, SOLUTION INTRAVENOUS at 13:26

## 2022-01-01 RX ADMIN — FENTANYL CITRATE 100 MCG: 50 INJECTION, SOLUTION INTRAMUSCULAR; INTRAVENOUS at 15:08

## 2022-01-01 RX ADMIN — NICARDIPINE HYDROCHLORIDE 5 MG/HR: 0.2 INJECTION, SOLUTION INTRAVENOUS at 05:57

## 2022-01-01 RX ADMIN — MORPHINE SULFATE 5 MG: 100 SOLUTION ORAL at 16:03

## 2022-01-01 RX ADMIN — HYDROMORPHONE HYDROCHLORIDE 0.2 MG: 0.2 INJECTION, SOLUTION INTRAMUSCULAR; INTRAVENOUS; SUBCUTANEOUS at 23:48

## 2022-01-01 RX ADMIN — FENTANYL CITRATE 50 MCG: 50 INJECTION INTRAMUSCULAR; INTRAVENOUS at 11:20

## 2022-01-01 RX ADMIN — PROPOFOL 20 MCG/KG/MIN: 10 INJECTION, EMULSION INTRAVENOUS at 13:50

## 2022-01-01 RX ADMIN — FAMOTIDINE 20 MG: 20 INJECTION, SOLUTION INTRAVENOUS at 21:20

## 2022-01-01 RX ADMIN — FENTANYL CITRATE 50 MCG: 50 INJECTION INTRAMUSCULAR; INTRAVENOUS at 12:07

## 2022-01-01 RX ADMIN — FENTANYL CITRATE 50 MCG: 50 INJECTION INTRAMUSCULAR; INTRAVENOUS at 13:49

## 2022-01-01 RX ADMIN — FENTANYL CITRATE 50 MCG: 50 INJECTION INTRAMUSCULAR; INTRAVENOUS at 12:56

## 2022-01-01 RX ADMIN — SODIUM CHLORIDE, POTASSIUM CHLORIDE, SODIUM LACTATE AND CALCIUM CHLORIDE: 600; 310; 30; 20 INJECTION, SOLUTION INTRAVENOUS at 15:00

## 2022-01-01 RX ADMIN — ETOMIDATE 20 MG: 20 INJECTION, SOLUTION INTRAVENOUS at 13:44

## 2022-01-01 RX ADMIN — FENTANYL CITRATE 50 MCG: 50 INJECTION INTRAMUSCULAR; INTRAVENOUS at 10:36

## 2022-01-01 RX ADMIN — MIDAZOLAM 2 MG: 1 INJECTION INTRAMUSCULAR; INTRAVENOUS at 15:21

## 2022-01-01 RX ADMIN — NICARDIPINE HYDROCHLORIDE 2.5 MG/HR: 0.2 INJECTION, SOLUTION INTRAVENOUS at 14:00

## 2022-01-01 RX ADMIN — PROPOFOL 30 MG: 10 INJECTION, EMULSION INTRAVENOUS at 15:08

## 2022-01-01 RX ADMIN — MIDAZOLAM HYDROCHLORIDE 5 MG: 1 INJECTION, SOLUTION INTRAMUSCULAR; INTRAVENOUS at 14:55

## 2022-01-01 RX ADMIN — FENTANYL CITRATE 50 MCG: 50 INJECTION INTRAMUSCULAR; INTRAVENOUS at 11:46

## 2022-01-01 RX ADMIN — MIDAZOLAM HYDROCHLORIDE 5 MG: 1 INJECTION, SOLUTION INTRAMUSCULAR; INTRAVENOUS at 12:07

## 2022-01-01 RX ADMIN — NICARDIPINE HYDROCHLORIDE 7.5 MG/HR: 0.2 INJECTION, SOLUTION INTRAVENOUS at 22:10

## 2022-01-01 RX ADMIN — MANNITOL 50 G: 20 INJECTION, SOLUTION INTRAVENOUS at 15:13

## 2022-01-01 RX ADMIN — SUGAMMADEX 200 MG: 100 INJECTION, SOLUTION INTRAVENOUS at 16:39

## 2022-01-01 ASSESSMENT — ACTIVITIES OF DAILY LIVING (ADL)
ADLS_ACUITY_SCORE: 47
ADLS_ACUITY_SCORE: 47
ADLS_ACUITY_SCORE: 35
ADLS_ACUITY_SCORE: 47
ADLS_ACUITY_SCORE: 35
ADLS_ACUITY_SCORE: 47
ADLS_ACUITY_SCORE: 47
ADLS_ACUITY_SCORE: 39
ADLS_ACUITY_SCORE: 47
ADLS_ACUITY_SCORE: 47
ADLS_ACUITY_SCORE: 51
ADLS_ACUITY_SCORE: 39
ADLS_ACUITY_SCORE: 47
ADLS_ACUITY_SCORE: 47
ADLS_ACUITY_SCORE: 39

## 2022-01-01 ASSESSMENT — VISUAL ACUITY
OU: NOT TESTABLE

## 2022-07-06 PROBLEM — S06.33AA INTRAPARENCHYMAL HEMATOMA OF BRAIN (H): Status: ACTIVE | Noted: 2022-01-01

## 2022-07-06 NOTE — H&P
[facility]       NEUROSURGERY  H&P NOTE    This consultation was requested by Dr. Butler from the  service.    Reason for Consultation:     HPI:   Patient is a 47M with PMHx severe alcohol use, uncontrolled HTN, rarely sees a doctor, found down unresponsive this morning by his daughter. His last known normal was last night. Upon arrival to [facility] CTA demonstrated 6.1 cm acute IPH right basal ganglia with intraventricular extension with mass effect causing leftward 1.3 cm shift, subfalcine herniation and mild hydrocephalus with a concern for underlying AVM. Patient was intubated quickly due to failure to protect his airway, and emergently transferred to [facility] for treatment.     Upon arrival to [facility], patient was on nicardipine drip, intubated, and emergently transferred to OR for EVD placement. Bilateral pupils were 5mm and non reactive at the time. After EVD placement patient was transferred to ICU for critical care management.    PAST MEDICAL HISTORY: No past medical history on file.   Alcohol use disorder  Depression  Hypertension    PAST SURGICAL HISTORY: No past surgical history on file.  Previous leg surgery with delayed recovery    FAMILY HISTORY: No family history on file.    SOCIAL HISTORY:   Social History     Tobacco Use     Smoking status: Current Every Day Smoker     Packs/day: 0.25     Smokeless tobacco: Never Used   Substance Use Topics     Alcohol use: Yes       MEDICATIONS:  Medications Prior to Admission   Medication Sig Dispense Refill Last Dose     FLUoxetine (PROZAC) 20 MG capsule [FLUOXETINE (PROZAC) 20 MG CAPSULE] Take 1 capsule (20 mg total) by mouth daily. 30 capsule 11        Allergies:  Allergies   Allergen Reactions     Sevoflurane      Possible malignant hyperthermia. Patient arrived intubated / encephalopathic.  Difficult to be clear with daughter due to language barrier, even when communicating through , but my understanding  "was that Young had a leg surgery in the past after which he got \"very hot\" and had muscle weakness afterwards and required an unexpectedly long hospitalization, although he has not had issues with subsequent surgeries     Succinylcholine      Possible malignant hyperthermia. Patient arrived intubated / encephalopathic.  Difficult to be clear with daughter due to language barrier, even when communicating through , but my understanding was that Young had a leg surgery in the past after which he got \"very hot\" and had muscle weakness afterwards and required an unexpectedly long hospitalization, although he has not had issues with subsequent surgeries       ROS: 10 point ROS were all negative except for pertinent positives noted in my HPI.    Physical exam:   Pulse 101, SpO2 100 %.  CV: HR and BP as noted above  PULM: intubated and sedated   ABD: soft, non-distended  NEUROLOGIC:  - intubated and sedated  - 5mm fixed and dilated pupils bilaterally  - no corneals  - no cough or gag  - no response to noxious stimuli      LABS:  Recent Labs   Lab 07/06/22  1650 07/06/22  1340   NA  --  136   POTASSIUM  --  3.6   CHLORIDE  --  94*   CO2  --  21*   ANIONGAP  --  21*   * 154*   BUN  --  6*   CR  --  0.69*   CELIA  --  8.7       Recent Labs   Lab 07/06/22  1340   WBC 8.4   RBC 3.89*   HGB 13.2*   HCT 37.8*   MCV 97   MCH 33.9*   MCHC 34.9   RDW 14.7   PLT 64*       IMAGING:  CTA Head and Neck 7/6/2022  HEAD CT:  1.  6.1 cm acute intraparenchymal hemorrhage centered in the right basal ganglia.  2.  Intraventricular hemorrhage in combination with mass effect causes mild hydrocephalus.  3.  Mass effect causes 1.3 cm of leftward midline shift/subfalcine herniation.     HEAD CTA:  1.  Irregular linear contrast enhancement within the right basal ganglia hemorrhage indicates at least a component of active presumably arterial bleeding. Underlying vascular lesion such as an AVM is a consideration.     NECK CTA:  1.  Mild " carotid artery atherosclerosis. No dissection or hemodynamically significant narrowing in the neck by NASCET criteria.       Intracerebral Hemorrhage:  Non-Traumatic Standard Measures for Stroke     ICH score done at arrival: 07/06/22    ICH Score Tool Patients Score   Age ? 80 years 1 point 0   GCS score  3-4  5-12   13-15    2 point  1 point  0 point 2   Hematoma volume, cm3 ? 30 1 point 1   Intraventricular extension 1 point 1   Infratentorial location 1 point 0   Patient s ICH Score (0-6) = 4      Admission INR:   INR   Date Value Ref Range Status   07/06/2022 1.15 0.85 - 1.15 Final           CT Head 7/6/2022  Impression: Right basal ganglia acute intracranial hemorrhage extending to right cerebral peduncle and mesencephalon. Opening into the ventricular system with increased size of ventricular system since earlier CT status post left frontal ventriculostomy placement. Diffuse effacement of the supratentorial sulci and basal cisterns with 16 mm right-to-left subfalcine shift.     ASSESSMENT:  Patient is a 47M with PMHx severe alcohol use, uncontrolled HTN, who was found down and unresponsive and taken to ED, with CTA demonstrating 6.1 cm acute IPH right basal ganglia with intraventricular extension with mass effect causing leftward 1.3 cm shift for which he was transferred to [facility]. Now s/p emergent left extraventricular drain placement with CT demonstrating worsening right basal ganglia acute ICH with IVH and diffuse effacement of supratentorial sulci and basal cisterns with 16mm right to left subfalcine shift. After return to ICU, discussion with family resulted in patient being DNR.     Clinically Significant Risk Factors Present on Admission            # Anion Gap Metabolic Acidosis: AG = 21 mmol/L (Ref range: 5 - 18 mmol/L) on admission, will monitor and treat as appropriate    # Thrombocytopenia: Plts = 64 10e3/uL (Ref range: 150 - 450 10e3/uL) on admission, will monitor for bleeding    # Compression of  brain       RECOMMENDATIONS:  No further neurosurgical intervention at this   Palliative care consult  Q1 neuro exams   Transfuse 1u platelets  Maintain SBP < 140 using nicardipine, labetalol, and hydralazine as needed  Wean ventilator as tolerated  NPO  Bowel regimen. PRN anti-emetics.  Protonix for ulcer ppx while ventilated  Normonatremia   Electrolyte replacement protocol  Platelets > 100,000  INR < 1.5  DVT: SCDs while in bed  Disposition: ICU       The patient was discussed with Dr. oLpez, neurosurgery chief resident, and Dr. Curry, neurosurgery staff, and they agree with the above.    Delia Stinson MD, PhD  PGY-2 Neurosurgery  Pager: [phone]

## 2022-07-06 NOTE — CONSULTS
[facility]    Stroke Telephone Note    I was called by Elio Butler on 07/06/22 regarding patient Young Peralta. The patient is a 47 year old male known past medical history who presents to this ED by EMS for evaluation of unresponsiveness, headache. Per EMS, last known well last night. Patient was reportedly complaining of a headache last night, holding the right side of his head. This morning was found lying on the floor next to the side of his bed, minimally responsive to family. Still complaining of a headache this morning. Not moving his left side.     Stroke Code Data (for stroke code without tele)  Stroke code activated 07/06/22   1330   Stroke provider first response  07/06/22   1336    07/06/22   1336   Last known normal      (Last night prior to bed)        Time of discovery   (or onset of symptoms)      (Not reported)   Head CT read by Stroke Neuro Dr/Provider 07/06/22   1333   Was stroke code de-escalated? No              Imaging Findings   Ctr head showed large R thalamic hemorrhage with with IVH extension including extension to 4th ventricle and hydrocephalus  CTA unremarkable    Intravenous Thrombolysis  Not given due to:   - ICH    Endovascular Treatment  Not initiated due to absence of proximal vessel occlusion    Impression  Non-traumatic intracerebral hemorrhage of R thalamus      Recommendations   Acute Hemorrhagic Stroke Recommendations  - STAT Neurosurgery consultation for EVD placement  - Transfer to either [facility] or [facility] ICU for further management   - Neurochecks and Vital Signs every 1 hours  - Systolic BP Goal: < 160  - Head of bed elevated  - Telemetry, EKG  - Imaging: Repeat CT scan in 4 hours  - Bedside Glucose Monitoring  - A1c, Troponin x 3  - PT/OT/SLP  - Stroke Education  - Euthermia, Euglycemia    My recommendations are based on the information provided over the phone by Young Peralta's in-person providers. They are not intended to replace the clinical  "judgment of his in-person providers. I was not requested to personally see or examine the patient at this time.    Angel Vega MD  Vascular Neurology  To page me or covering stroke neurology team member, click here: AMCOM   Choose \"On Call\" tab at top, then search dropdown box for \"Neurology Adult\", select location, press Enter, then look for stroke/neuro ICU/telestroke.           "

## 2022-07-06 NOTE — ED PROVIDER NOTES
EMERGENCY DEPARTMENT ENCOUnter      NAME: Young Peralta  AGE: 47 year old male  YOB: 1975  MRN: [id number]  EVALUATION DATE & TIME: No admission date for patient encounter.    PCP: No primary care provider on file.    ED PROVIDER: Elio Butler DO      Chief Complaint   Patient presents with     Unresponsive       FINAL IMPRESSION:  1. Intracranial hemorrhage (H)        ED COURSE & MEDICAL DECISION MAKIN:20 PM I met with the patient, obtained history, performed an initial exam, and discussed options and plan for diagnostics and treatment here in the ED. Evaluated on arrival to the ED. Patient brought directly to CT.  1:31 PM Spoke with stroke neurology.  1:33 PM Spoke with neurology.  1:48 PM Spoke with MALIK Fry from neurosurgery.  2:02 PM Discussed with Dr. Curry, neurosurgery at [facility].  2:15 PM Rechecked on patient. Transport is here to transfer to [facility].      The patient presented emerged from today after being found down at home.  Last known well time was last night.  He was able to communicate to family today that he had a headache.  He was not moving his left side for EMS during transport.  He was immediately taken to CT upon arrival and was found to have a large 6 cm basal ganglia hemorrhage with signs of active bleeding and 1.3 cm of shift.  His case was discussed with neurosurgery both here and at the [facility].  The neurosurgeon at the [facility] recommended emergent transport to their facility for further treatment.  In the meantime the patient was intubated easily and is on both a propofol and nicardipine drip.  Plan is for the patient to go directly to room with the [facility] emergency department and then likely to the operating room for definitive treatment.      At the conclusion of the encounter I discussed the results of all of the tests and the disposition. The questions were answered. The patient or family acknowledged  "understanding and was agreeable with the care plan.     The patient is critically ill and has required 60 minutes of critical care time exclusive of procedures. This includes time spent interviewing the patient, ordering tests and medications, monitoring vital signs, reviewing results, patient updates, discussing the case with family and consultants, and admission.      =================================================================    HPI  History limited secondary to acuity of condition, unresponsive.    Young Peralta is a 47 year old male without a known past medical history who presents to this ED by EMS for evaluation of unresponsiveness, headache. Per EMS, last known well last night. Patient was reportedly complaining of a headache last night, holding the right side of his head. This morning was found lying on the floor next to the side of his bed, minimally responsive to family. Still complaining of a headache this morning. Not moving his left side. EMS reports no purposeful movement and no response to painful stimuli. Blood sugar 110. Last BP for /120.      REVIEW OF SYSTEMS  ROS unable to be obtained secondary to acuity of condition, unresponsive.      PAST MEDICAL HISTORY:  History reviewed. No pertinent past medical history.    PAST SURGICAL HISTORY:  History reviewed. No pertinent surgical history.    CURRENT MEDICATIONS:    No current outpatient medications on file.    ALLERGIES:  Allergies   Allergen Reactions     Sevoflurane      Possible malignant hyperthermia. Patient arrived intubated / encephalopathic.  Difficult to be clear with daughter due to language barrier, even when communicating through , but my understanding was that Young had a leg surgery in the past after which he got \"very hot\" and had muscle weakness afterwards and required an unexpectedly long hospitalization, although he has not had issues with subsequent surgeries     Succinylcholine      Possible malignant " "hyperthermia. Patient arrived intubated / encephalopathic.  Difficult to be clear with daughter due to language barrier, even when communicating through , but my understanding was that Young had a leg surgery in the past after which he got \"very hot\" and had muscle weakness afterwards and required an unexpectedly long hospitalization, although he has not had issues with subsequent surgeries       FAMILY HISTORY:  History reviewed. No pertinent family history.    SOCIAL HISTORY:   Social History     Socioeconomic History     Marital status:      Spouse name: None     Number of children: None     Years of education: None     Highest education level: None   Tobacco Use     Smoking status: Current Every Day Smoker     Packs/day: 0.25     Smokeless tobacco: Never Used   Substance and Sexual Activity     Alcohol use: Yes       VITALS:  Patient Vitals for the past 24 hrs:   BP Pulse Resp SpO2 Weight   07/06/22 1415 (!) 189/104 91 16 100 % --   07/06/22 1400 (!) 169/108 93 16 100 % --   07/06/22 1355 (!) 222/120 108 16 100 % --   07/06/22 1351 -- -- -- -- 60 kg (132 lb 4.4 oz)   07/06/22 1350 (!) 173/100 113 -- 100 % --       PHYSICAL EXAM    Constitutional:  Well developed, Well nourished,  HENT:  Normocephalic, Atraumatic, Bilateral external ears normal, Oropharynx moist, Nose normal.   Neck:  Normal range of motion, No meningismus, No stridor.   Eyes:  EOMI, Conjunctiva normal, No discharge.  Dilated right pupil  Respiratory:  Normal breath sounds, No respiratory distress, No wheezing, No chest tenderness.   Cardiovascular: Tachycardic, Normal rhythm, No murmurs  GI:  Soft, No tenderness, No guarding  Musculoskeletal:   No signs of traumatic injury or major deformities noted.   Integument:  Warm, Dry, No erythema, No rash.   Lymphatic:  No lymphadenopathy noted.   Neurologic: Minimally arousable, not moving the left upper and lower extremities.       LAB:  All pertinent labs reviewed and " interpreted.  Results for orders placed or performed during the hospital encounter of 07/06/22              Result Value Ref Range    INR 1.15 0.85 - 1.15   Partial thromboplastin time   Result Value Ref Range    aPTT 27 22 - 38 Seconds   CBC with platelets and differential   Result Value Ref Range    WBC Count 8.4 4.0 - 11.0 10e3/uL    RBC Count 3.89 (L) 4.40 - 5.90 10e6/uL    Hemoglobin 13.2 (L) 13.3 - 17.7 g/dL    Hematocrit 37.8 (L) 40.0 - 53.0 %    MCV 97 78 - 100 fL    MCH 33.9 (H) 26.5 - 33.0 pg    MCHC 34.9 31.5 - 36.5 g/dL    RDW 14.7 10.0 - 15.0 %    Platelet Count 64 (L) 150 - 450 10e3/uL    % Neutrophils 58 %    % Lymphocytes 30 %    % Monocytes 9 %    % Eosinophils 1 %    % Basophils 1 %    % Immature Granulocytes 1 %    NRBCs per 100 WBC 0 <1 /100    Absolute Neutrophils 4.9 1.6 - 8.3 10e3/uL    Absolute Lymphocytes 2.5 0.8 - 5.3 10e3/uL    Absolute Monocytes 0.8 0.0 - 1.3 10e3/uL    Absolute Eosinophils 0.1 0.0 - 0.7 10e3/uL    Absolute Basophils 0.1 0.0 - 0.2 10e3/uL    Absolute Immature Granulocytes 0.0 <=0.4 10e3/uL    Absolute NRBCs 0.0 10e3/uL       RADIOLOGY:  I have independently reviewed and interpreted the above imaging, pending the final radiology read.  CTA Head Neck with Contrast   Final Result   CONCLUSION:   HEAD CT:   1.  6.1 cm acute intraparenchymal hemorrhage centered in the right basal ganglia.   2.  Intraventricular hemorrhage in combination with mass effect causes mild hydrocephalus.   3.  Mass effect causes 1.3 cm of leftward midline shift/subfalcine herniation.      HEAD CTA:   1.  Irregular linear contrast enhancement within the right basal ganglia hemorrhage indicates at least a component of active presumably arterial bleeding. Underlying vascular lesion such as an AVM is a consideration.      NECK CTA:   1.  Mild carotid artery atherosclerosis. No dissection or hemodynamically significant narrowing in the neck by NASCET criteria.      Noncontrast head CT findings were  discussed with Dr. Butler via telephone at 1333 hours, and CTA results of active bleeding were called to the ER at 1342 hours on 7/6/2022.                 PROCEDURES:  PROCEDURE: Rapid Sequence Intubation   INDICATIONS: Airway Protection   PROCEDURE PROVIDER: Dr Elio Butler   CONSENT: Consent for procedure was not obtained. Consent is implied given the emergent need.   PROCEDURE SPECIFIC CHECKLIST COMPLETED: Yes   TIME OUT: Universal protocol was followed. TIME OUT conducted just prior to starting procedure confirmed patient identity, site/side, procedure, patient position, and availability of correct equipment. Yes   MEDICATIONS: Etomidate, 20 mg, IV and Rocuronium, 80 mg IV   TUBE DETAILS: 7.5 tube, at 21 cm at the teeth   EQUIPMENT USED: Glidescope, size 3   POST-INTUBATION ASSESSMENT/NOTE: Difficulty of intubation:  Easy, straightforward    Post-intubation pulmonary exam:  equal and absent over the epigastrium    ET Tube placement was confirmed with:  auscultation with good, equal bilateral breath sounds, absence of breath sounds over the epigastrium, fog in the tube, colorimetric CO2 detector (good color change) and pulse oximeter readings stable or improving    Lowest oxygen saturation was 95%    Monitoring consisted of:  heart rate, cardiac monitor, continuous pulse oximeter, frequent blood pressure checks, IV access, constant attendance by RN until patient is recovered and constant attendance by MD until patient is stable     COMPLICATIONS: Patient tolerated procedure well, without complication       I, Que Zambrano, am serving as a scribe to document services personally performed by Dr. Butler based on my observation and the provider's statements to me. I, Elio Butler, DO attest that Que Zambrano is acting in a scribe capacity, has observed my performance of the services and has documented them in accordance with my direction.    Elio Butler, DO  Emergency Medicine  [facility]  [facility] EMERGENCY DEPARTMENT  [address] [place] [id number]  [phone]  Dept: [phone]     Elio Butler MD  07/06/22 1551

## 2022-07-06 NOTE — ANESTHESIA POSTPROCEDURE EVALUATION
Patient: Htay Fabian    Procedure: Procedure(s):  VENTRICULOSTOMY, OPEN       Anesthesia Type:  No value filed.    Note:  Disposition: ICU            ICU Sign Out: Anesthesiologist/ICU physician sign out WAS performed   Postop Pain Control: Uneventful            Sign Out: Well controlled pain   PONV: No   Neuro/Psych:             Events: Delayed emergence            Sign Out: Other neuro status   Airway/Respiratory: Uneventful            Sign Out: Acceptable/Baseline resp. status; AIRWAY IN SITU/Resp. Support               Airway in situ/Resp. Support: ETT   CV/Hemodynamics: Uneventful            Sign Out: Acceptable CV status; No obvious hypovolemia; No obvious fluid overload   Other NRE:    DID A NON-ROUTINE EVENT OCCUR? No    Event details/Postop Comments:  Patient with devastating brain bleed.  Taken to ICU after CT brain encephalopathic.    Any residual paralytic reversed; propofol discontinued.    ZAIN Kraus MD  Clinical   Anesthesia / Critical Care  *[id number]             Last vitals:  There were no vitals filed for this visit.    Electronically Signed By: Trip Kraus MD  July 6, 2022  4:42 PM

## 2022-07-06 NOTE — PROGRESS NOTES
NEUROSURGERY    ATTENDING NOTE    Patient is a 47 year old male who presented to the [facility] ER with altered mental status.  Per report, his last known normal was last night.  His daughter brought him to the ED because the patient was not moving his left side and he was grunting.    In the ED, he was found to have left side paralysis and worsening neurological exam.  He was intubated for airway protection.  CT head demonstrated large right basal ganglia hemorrhage with extension into the ventricular system, with significant mass effect and midline shift.    Neurosurgery was called and determination was made to transfer him ASAP to the [facility] ED and then to the OR for placement of left side external ventricular drain placement.  Per ED physician, patient was intubated and his right pupil was larger than his left pupil.    Upon arrival in the OR, patient was found to have bilateral pupils which were 5 mm bilaterally and non-reactive.  His blood pressures were in the 90's systolic so the anesthesia team discontinued the Nicardipine drip.  He was also known to be tachycardic.    Not much history was known but our anesthesia team learned that the patient has a history of EtOH use.  His platelet count was also 64,000 and INR was 1.15.    In the OR, we proceeded with an emergent left sided EVD.  We decided not to wait for the platelets, as this would cause further delay.  Mannitol, 50 gm, was also administered.    The opening pressure was greater than 15 cm H2O and the output was very bloody.    At this time, his neurological exam did not improve.  His pupils were still 5 mm and non-reactive bilaterally.  He did not have any corneal or gag reflex.    Patient subsequently went to the CT for post-EVD scan.  It demonstrates worsening hemorrhage with enlarged intracerebral and intraventricular hemorrhage with worsening mass effect and midline shift.  He was subsequently brought up to the ICU.    I discussed the situation with  the patient's daughters.  Aida  was used, via a telephone  service.  The '[place] [id number].    I explained the clinical situation and his clinical course, from the ED to now.  I explained the CT head findings, from [place] and from [facility] after the EVD placement.  The images were reviewed and questions answered.    The following were discussed.  Patient has a large intracerebral hemorrhage with extension into the ventricles.  He has significant mass effect.  He also has thrombocytopenia.  The cause of the bleeding could be hypertensive, mass or abnormal blood vessel such as AVM.  Regardless, there is significant compression and his neurological exam is poor.  I explained that his exam is consistent with brain death.    The family, mainly his daughters, asked if surgery is indicated to remove the blood clot.  I explained that the patient has underlying bleeding issues and if taken to the OR, we may not be able to control his bleeding.  Also, if his bleeding is caused by abnormal blood vessel, controlling his bleeding may also be difficult.  Third, evacuation would also result in injury to critical structures in the brain so there would be no improvement in his function.  Most importantly, as his exam is consistent with but not diagnostic of brain death, there is no real role for surgery.    I explained the different options.  First is that we can continue to support him until his family has had time and life support can be withdrawn.  Another is to perform exams to confirm his brain death status before discontinuing the support.  The daughters did not express any desires at this time to pursue an official brain death exam.  They would like some time to see their father and to notify other relatives.    I did discuss the patient's code status.  I explained that although we will continue to support him for now, if his heart stops working, we need to decide if we are going to perform CPR  "or shocks.  They do not wish that we perform resuscitation.  I explained that this means that he will be \"do not resuscitate\".  They are in agreement.      ASSESSMENT  47 year old male  Large right basal ganglia hemorrhage with extension into ventricle  Mass effect, midline shift and brain compression  Thrombocytopenia  Poor neurological exam      PLAN  Continue supportive care  Code status is Do Not Resuscitate  Family will let us know when they are ready for withdrawal of care  Palliative care consultation  "

## 2022-07-06 NOTE — SIGNIFICANT EVENT
"Anesthesia Staff Note:    I have entered \"allergies\" to succinylcholine and sevoflurane in the chart based on a possible (even though statistically unlikely) history of malignant hyperthermia.    This patient arrived intubated / encephalopathic (and non-responsive to aggressive stimulation without sedation) to the operating room in the context of a devastating intracerebral bleed. I was able to briefly speak with the daughter through a Aida .  There nonetheless remained a significant language barrier.  My understanding after multiple attempts at asking in different ways was that Young had a leg surgery in the remote past after which he got \"very hot\" and had muscle weakness afterwards and required an unexpectedly long hospitalization.  The daughter had not heard of Malignant hyperthemia, however, said he had not been given paperwork or other instructions to notify anesthesiologists to avoid volatile agents, and she said he has not had issues with subsequent surgeries.  She also said something about wondering if it was his alcoholism that caused him to be so hot after surgery.    Although we started the procedure with a volatile-agent based anesthetic, we transitioned to propofol-only with high flows to flush out the sevoflurane after I reported to the in-room team the possible history of MH.    If the patient develops dark urine, elevated CK, recalcitrant hyperthermia / rising CO2 or other signs consistent with malignant hyperthermia, I would recommend empiric treatment with dantrolene, although rhabdomyolysis (from having been found down), alcohol withdrawal, and neurogenically-mediated sympathetic storming are all also potential risks in this patient that could cause a similar spectrum of symptoms.     In addition to the above, have also updated the ICU team about his reported significant alcohol use, his apparent desire to be \"full code\" per the daughter even in the context of a devastating brain " bleed, and a significant anion-gap metabolic acidosis (presumed for now to be lactic acidosis, although I did not have the ability to draw new labs available to me in the operating room) noted on BMP.  The patient also has an unexplained thrombocytopenia (potentially related to hypersplenism in the context of his alcoholism) for which platelets were ordered but not delivered to the operating room on time.    ZAIN Kraus MD  Clinical   Anesthesia / Critical Care  *[phone]

## 2022-07-06 NOTE — ANESTHESIA CARE TRANSFER NOTE
Patient: Htay Fabian    Procedure: Procedure(s):  VENTRICULOSTOMY, OPEN       Diagnosis: Brain bleed (H) [I61.9]  Diagnosis Additional Information: No value filed.    Anesthesia Type:   No value filed.     Note:    Oropharynx: ventilatory support  Level of Consciousness: unresponsive      Independent Airway: airway patency not satisfactory and stable  Dentition: dentition unchanged  Vital Signs Stable: post-procedure vital signs reviewed and stable    Patient transferred to: ICU    ICU Handoff: Call for PAUSE to initiate/utilize ICU HANDOFF, Identified Patient, Identified Responsible Provider, Reviewed the Pertinent Medical History, Discussed Surgical Course, Reviewed Intra-OP Anesthesia Management and Issues during Anesthesia, Set Expectations for Post Procedure Period and Allowed Opportunity for Questions and Acknowledgement of Understanding      Vitals:  Vitals Value Taken Time   /63 07/06/22 1630   Temp     Pulse 71 07/06/22 1632   Resp 14 07/06/22 1632   SpO2 98 % 07/06/22 1632   Vitals shown include unvalidated device data.    Electronically Signed By: Trip Kraus MD  July 6, 2022  4:41 PM

## 2022-07-06 NOTE — CONSULTS
Neurocritical Care Consultation    Reason for critical care admission: Right basal ganglia IPH  Admitting Team: Neurosurgery  Date of Service:  07/06/2022  Date of Admission:  7/6/2022  Hospital Day: 1     Assessment/Plan  Young Peralta is a 47 year old male with PMHx of depression was admitted on 7/6/2022 at [facility] for 6.1 cm acute IPH of right basal ganglia with intraventricular extension and 1.3 cm mass effect requiring intubation for airway protection, was transferred to [facility] s/p EVD.     24 hour events:  Admitted on 7/6/2022 from [facility]. Found down this morning beside his bed minimally responsive to stimuli. Last known normal last night. Had been complaining of headache. Minimally responsive on exam with no purposeful movement of L extremities. Intubated and transferred to [facility] OR for EVD placement by RANDOLPH.     Neuro  #Right basal ganglia bleed with intraventricular extension s/p EVD with ICH score of 4 with midline shift   #obstructive hydrocephalus  -Neurochecks every 1 hrs  -HOB > 30   -SBP goal < 160 mmHg  -PT/OT/SLP  -PRN labetolol and hydralazine  -Repeat CT head in AM  -s/p EVD placement in the OR: 10 above EAM ICP of 24, no output documented currently     #Analgesics & sedation  - PRNs if needed     CV  #hypertension  - EKG, trop I  -Cardiac monitoring  -SBP goal < 160 mmHg  -PRN labetalol and hydralazine     Resp  #acute hypoxic respiratory failure   CXR to confirm ET tube placement  Oxygen/vent:CMV  -Continuous pulse ox  -Maintain O2 saturations greater than 92%     GI  #GRETCHEN  Diet:NPO  Last BM:Unkown  GI prophylaxis: Famotidine   -Bowel regimen: scheduled senna-docusate and Miralax     Renal/  #Raised Anion Gap Metabolic acidosis  #Possible hematuria  -CMP, Lactate, ABGs, CK, UA  -Daily BMP  -IV fluids: Plasmolyte @125 ml/hr  -Electrolyte replacement protocol     Endo  #GRETCHEN  -Hgb A1c  -Monitor glucose levels     Heme  #thrombocytopenia   -Daily CBC  -Hgb goal >7, plt goal >50k  -Transfuse to meet  "Hgb and plt goals     ID  #GRETCHEN  -Daily CBC  -Follow temperature curve     ICU Check List  Lines/tubes/drains: Intubated, Arterial line, PIV, EVD  FEN:NPO  PPX: DVT - SCDs; GI - Famotidine.  Code: DNR - discussion with daughter and Dr. Curry  Dispo: ICU - NCC    Clinically Significant Risk Factors Present on Admission            # Anion Gap Metabolic Acidosis: AG = 21 mmol/L (Ref range: 5 - 18 mmol/L) on admission, will monitor and treat as appropriate    # Thrombocytopenia: Plts = 64 10e3/uL (Ref range: 150 - 450 10e3/uL) on admission, will monitor for bleeding           TIME SPENT ON THIS ENCOUNTER  I spent 35 minutes of critical care time on the unit/floor managing the care of Young Peralta. Upon evaluation, this patient had a high probability of imminent or life-threatening deterioration due to right basal ganglia IPH, which required my direct attention, intervention, and personal management. Greater than 50% of my time was spent at the bedside counseling the patient and/or coordinating care regarding services listed in this note.    The patient was seen and discussed with the NCC attending, Dr. Enrique Gentile.    OCTAVIANO Nash, CNP  Neurocritical Care  *[phone]  Text Page    History of Present Illness:  Young Peralta is a 47 year old male with PMHx of depression was admitted on 7/6/2022 at [facility] for 6.1 cm acute IPH of right basal ganglia with intraventricular extension and 1.3 cm mass effect requiring intubation for airway protection, was transferred to [facility] s/p EVD.      Allergies   Allergen Reactions     Sevoflurane      Possible malignant hyperthermia. Patient arrived intubated / encephalopathic.  Difficult to be clear with daughter due to language barrier, even when communicating through , but my understanding was that Young had a leg surgery in the past after which he got \"very hot\" and had muscle weakness afterwards and required an unexpectedly long hospitalization, although he has not " "had issues with subsequent surgeries     Succinylcholine      Possible malignant hyperthermia. Patient arrived intubated / encephalopathic.  Difficult to be clear with daughter due to language barrier, even when communicating through , but my understanding was that Young had a leg surgery in the past after which he got \"very hot\" and had muscle weakness afterwards and required an unexpectedly long hospitalization, although he has not had issues with subsequent surgeries       PAST MEDICAL HISTORY: No past medical history on file. sedation, mentation, critical condition.      PAST SURGICAL HISTORY: No past surgical history on file. sedation, mentation, critical condition.      FAMILY HISTORY: Unable to obtain due to: sedation, mentation, critical condition.      SOCIAL HISTORY:   Social History     Tobacco Use     Smoking status: Current Every Day Smoker     Packs/day: 0.25     Smokeless tobacco: Never Used   Substance Use Topics     Alcohol use: Yes     Review of systems not obtained due to patient factors - critical condition, language barrier, mental status, intubation and sedation    Current Medications:    acetaminophen  975 mg Oral or Feeding Tube Q8H     famotidine  20 mg Intravenous BID    Or     famotidine  20 mg Oral BID     [START ON 7/7/2022] polyethylene glycol  17 g Oral or Feeding Tube Daily     senna-docusate  1 tablet Oral or Feeding Tube BID     sodium chloride (PF)  3 mL Intracatheter Q8H       PRN Medications:  [START ON 7/9/2022] acetaminophen, bisacodyl, flumazenil, hydrALAZINE, HYDROmorphone **OR** HYDROmorphone, labetalol, lidocaine 4%, lidocaine (buffered or not buffered), magnesium hydroxide, naloxone **OR** naloxone **OR** naloxone **OR** naloxone, ondansetron **OR** ondansetron, prochlorperazine **OR** prochlorperazine, sennosides, sodium chloride (PF)    Infusions:    niCARdipine       Plasma-Lyte A         Physical Examination:  Vitals: Pulse 101   SpO2 100%   General: Adult " male patient, lying in bed, critically-ill   HEENT: Normocephalic, atraumatic, no icterus, oral cavity/oropharynx pink and moist  Cardiac: RRR, per-monitor   Chest: synchronous with vent, not over breathing, symmetric chest rise  Abdomen: Soft, non-distended,  Extremities: Warm, no edema, distal pulses +2, well perfused  Skin: No rash or lesion on exposed skin  Psych: MARISABEL  Neuro: no brain stem reflexes, pupillometry shows NPI of 0 bilat 5 mm, no cough and gag,    NIHSS  Interval transfer (07/06/22 1700)   1a. Level of Consciousness 3-->Responds only with reflex motor or autonomic effects or totally unresponsive, flaccid, and areflexic   1b. LOC Questions 2-->Answers neither question correctly   1c. LOC Commands 2-->Performs neither task correctly   2.   Best Gaze 2-->Forced deviation, or total gaze paresis not overcome by the oculocephalic maneuver   3.   Visual 3-->Bilateral hemianopia (blind including cortical blindness)   4.   Facial Palsy 3-->Complete paralysis of one or both sides (absence of facial movement in the upper and lower face)   5a. Motor Arm, Left 4-->No movement   5b. Motor Arm, Right 4-->No movement   6a. Motor Leg, Left 4-->No movement   6b. Motor Leg, right 4-->No movement   7.   Limb Ataxia 0-->Absent   8.   Sensory 2-->Severe to total sensory loss, patient is not aware of being touched in the face, arm, and leg   9.   Best Language 3-->Mute, global aphasia, no usable speech or auditory comprehension   10. Dysarthria (UN) Intubated or other physical barrier   11. Extinction and Inattention  0-->No abnormality   Total 36 (07/06/22 1700)     ICH Score (at arrival)  Scoring Tool Score   Age ? 80 years 1 point No   GCS score  3-4   5-12   13-15   2 point  1 point  0 point GCS 3-4   Hematoma volume, cm 3 ? 30 1 point Yes   Intraventricular extension 1 point Yes   Infratentorial location 1 point No   Total 4     Labs and imaging:    Results for orders placed or performed during the hospital encounter  of 07/06/22 (from the past 24 hour(s))   ABO/Rh type and screen    Narrative    The following orders were created for panel order ABO/Rh type and screen.  Procedure                               Abnormality         Status                     ---------                               -----------         ------                     Adult Type and Screen[[id number]]                            In process                   Please view results for these tests on the individual orders.   CT Head w/o Contrast    Narrative    CT HEAD W/O CONTRAST 7/6/2022 4:39 PM    History: s/p emergent EVD placement left side    Comparison:    Technique:  Using multidetector thin collimation helical acquisition  technique, axial, coronal and sagittal CT images from the skull base  to the vertex were obtained.    Findings: Left frontal approach ventriculostomy catheter placement new  compared with prior. Right basal ganglia hemorrhage opening to the  ventricular system. Increased volume intraventricular bleeding.  Increased size of lateral ventricles, third ventricle and fourth  ventricle. Diffuse effacement of cerebral sulci due to mass effect.  Hemorrhage also extends to right cerebral peduncle, mesencephalon and  superior betty. 16 mm right to left subfalcine shift. Near complete  effacement of basal cisterns.    No skull fracture. Grossly normal soft tissues. Expected  pneumocephalus due to ventriculostomy placement.      Impression    Impression: Right basal ganglia acute intracranial hemorrhage  extending to right cerebral peduncle and mesencephalon. Opening into  the ventricular system with increased size of ventricular system since  earlier CT status post left frontal ventriculostomy placement. Diffuse  effacement of the supratentorial sulci and basal cisterns with 16 mm  right-to-left subfalcine shift.         TIESHA BLAND MD         SYSTEM ID:  [id number]   EKG 12-lead, complete   Result Value Ref Range    Systolic Blood Pressure  mmHg     Diastolic Blood Pressure  mmHg    Ventricular Rate 100 BPM    Atrial Rate 100 BPM    NV Interval 122 ms    QRS Duration 112 ms     ms    QTc 500 ms    P Axis 70 degrees    R AXIS 75 degrees    T Axis 210 degrees    Interpretation ECG       Sinus rhythm  Left ventricular hypertrophy with repolarization abnormality  Prolonged QT  Abnormal ECG  No previous ECGs available       All relevant imaging and laboratory values personally reviewed.

## 2022-07-06 NOTE — BRIEF OP NOTE
[facility]    Brief Operative Note    Pre-operative diagnosis: Brain bleed (H) [I61.9]  Post-operative diagnosis Same as pre-operative diagnosis    Procedure: Procedure(s):  VENTRICULOSTOMY, OPEN  Surgeon: Surgeon(s) and Role:     * Cesar Curry MD - Primary   Delia Stinson- Resident, Assisting  Anesthesia: General   Estimated Blood Loss: 3 mL from 7/6/2022  2:58 PM to 7/6/2022  4:26 PM      Drains:  extraventricular drain  Specimens: * No specimens in log *  Findings:   Brisk, dark red CSF egress upon entry into ventricle  Complications: None  Implants: * No implants in log *      Closure: Nylon sutures, vini    Delia Stinson MD  Neurosurgery PGY-2

## 2022-07-06 NOTE — ED NOTES
Bed: [place] [address]  Expected date:   Expected time:   Means of arrival: Ambulance  Comments:  MPLW: Found down, possible stroke

## 2022-07-06 NOTE — PROGRESS NOTES
Called by Dr Butler from [facility] ED 1340 with CTA results 6.1 cm acute IPH right basal ganglia with intraventricular extension with mass effect causing 1.3 cm shift, subfalcine herniation and mild hydrocephalus. Concern for underlying AVM per radiology.     Recommend [facility] the [facility]  as well as neurology. BP management. Keppra was ordered by ED provider. HOB greater than 30 degrees at all times. Patient has been intubated.     JUNIE Ramirez-CNP  [facility] Neurosurgery  O: [phone]

## 2022-07-06 NOTE — ED TRIAGE NOTES
The pt presents via [facility] for evaluation of altered mental status. Pt's last known well was last night. This morning family found him on the floor next to his bed, minimally responsive, complaining of a headache, striking the R side of his head. No purposeful movement on his L side. .  systolic upon arrival. RR 18. R pupil nonreactive to light.

## 2022-07-06 NOTE — ANESTHESIA PREPROCEDURE EVALUATION
"Anesthesia Pre-Procedure Evaluation    Patient: Young Peralta   MRN: [id number] : 1975        Procedure : Procedure(s):  VENTRICULOSTOMY, OPEN          47M alcoholic with HTN, rarely sees a doctor, found down unresponsive this morning.  CTA results 6.1 cm acute IPH right basal ganglia with intraventricular extension with mass effect causing 1.3 cm shift, subfalcine herniation and mild hydrocephalus. Concern for underlying AVM.    Arrives emergently intubated, coagulopathic, on a nicardipine infusion.     SPOKE WITH DAUGHTER VIA .  LANGUAGE BARRIER MAY HAVE COMPLICATED THE CONVERSATION, BUT SHE APPEARED TO SAY THAT THE PATIENT HAD ISSUES WITH HIGH FEVER AND MUSCLE WEAKNESS (SHE WAS UNSURE ABOUT URINARY ISSUES) REQUIRING AN UNEXPECTEDLY LONG HOSPITAL STAY AFTER A PRIOR LEG SURGERY.  I INTERPRETED THIS AS POTENTIALLY CONSISTENT WITH MALIGNANT HYPERTHERMIA, ALTHOUGH THE DAUGHTER WAS ADAMANT THAT NO OTHER FAMILY MEMBERS HAVE HAD ISSUES WITH SURGERY, AND REPORTS THAT HE DID FINE AFTER HIS MOST RECENT SURGERY.    No past medical history on file.   No past surgical history on file.   No Known Allergies   Social History     Tobacco Use     Smoking status: Current Every Day Smoker     Packs/day: 0.25     Smokeless tobacco: Never Used   Substance Use Topics     Alcohol use: Yes      Wt Readings from Last 1 Encounters:   22 60 kg (132 lb 4.4 oz)        Anesthesia Evaluation   Pt has had prior anesthetic. Type: General.    History of anesthetic complications  - malignant hyperthermia.  Patient arrived intubated / encephalopathic.  Difficult to be clear with daughter due to language barrier, even when communicating through , but my understanding was that Young had a leg surgery in the past after which he got \"very hot\".    ROS/MED HX  ENT/Pulmonary:       Neurologic:       Cardiovascular:     (+) hypertension-----    METS/Exercise Tolerance:     Hematologic:     (+) anemia,     Musculoskeletal:     "   GI/Hepatic:       Renal/Genitourinary:       Endo:       Psychiatric/Substance Use:     (+) psychiatric history alcohol abuse     Infectious Disease:       Malignancy:       Other:            Physical Exam    Airway   unable to assess          Respiratory Devices and Support         Dental    unable to assess        Cardiovascular    unable to assess         Pulmonary    Unable to assess               OUTSIDE LABS:  CBC:   Lab Results   Component Value Date    WBC 8.4 07/06/2022    HGB 13.2 (L) 07/06/2022    HCT 37.8 (L) 07/06/2022    PLT 64 (L) 07/06/2022     BMP:   Lab Results   Component Value Date     07/06/2022    POTASSIUM 3.6 07/06/2022    CHLORIDE 94 (L) 07/06/2022    CO2 21 (L) 07/06/2022    BUN 6 (L) 07/06/2022    CR 0.69 (L) 07/06/2022     (H) 07/06/2022     COAGS:   Lab Results   Component Value Date    PTT 27 07/06/2022    INR 1.15 07/06/2022     POC: No results found for: BGM, HCG, HCGS  HEPATIC: No results found for: ALBUMIN, PROTTOTAL, ALT, AST, GGT, ALKPHOS, BILITOTAL, BILIDIRECT, JENIFFER  OTHER:   Lab Results   Component Value Date    CELIA 8.7 07/06/2022       Anesthesia Plan    ASA Status:  4, emergent       Anesthesia Type: General.     - Airway: ETT   Induction: Propofol.     - Malignant Hyperthermia Precautions   Maintenance: TIVA.   Techniques and Equipment:     - Lines/Monitors: Arterial Line, 2nd IV     Consents    Anesthesia Plan(s) and associated risks, benefits, and realistic alternatives discussed. Questions answered and patient/representative(s) expressed understanding.     - Discussed: Risks, Benefits and Alternatives for BOTH SEDATION and the PROCEDURE were discussed     - Discussed with:  , Other (See Comment)      - Specific Concerns: daughter.     - Extended Intubation/Ventilatory Support Discussed: Yes.      - Patient is DNR/DNI Status: No    Use of blood products discussed: Yes.     - Discussed with: Implied consent/Emergency, Other (see comment).      Postoperative Care    Pain management: IV analgesics.   PONV prophylaxis: Background Propofol Infusion     Comments:           H&P reviewed: Unable to attach H&P to encounter due to EHR limitations. H&P Update: appropriate H&P reviewed, patient examined. No interval changes since H&P (within 30 days).         Trip Kraus MD

## 2022-07-07 NOTE — PROGRESS NOTES
Neurosurgery Brief Progress Note    EVD drain removal    Drain not deemed to be necessary due to a transition towards comfort care. Drain site was prepped in usual sterile fashion with chloraprep. The drain was taken off suction. The sutures securing the drain were cut and the site was re-prepped. The drain was removed with tip intact. A single figure of 8 stitch with 3-0 vicryl was placed with adequate hemostasis.     Toro Garcia MD  Neurosurgery Resident  Pager [phone]    Please contact neurosurgery resident on call with questions.    Dial * * [phone], enter [phone] when prompted.

## 2022-07-07 NOTE — PROGRESS NOTES
Neuro- Pupils fixed. Does not arouse or withdraw to pain. Coughs with suction. Occasional head twitching with deep pain. EVD @ 10 above the EAM. ICPs 9-40. Output remains dark red (12-40ml) NSGY aware of output and ICPs.  CV- Tmax 100.3. Sinus tach 100s-120s. Titrating Nicardipine to maintain SBP <140.  Pulm- CMV settings. 30%, RR 20, , PEEP 5. Lungs coarse. Scant amount of secretions.  GI- NPO. No enteral access. No BM  - [place] in place. UOP adequate. Renate in color.    Plan to transition to comfort measures today per family.

## 2022-07-07 NOTE — OP NOTE
DATE OF PROCEDURE: 07/06/2022    PREOPERATIVE DIAGNOSIS:    1.  Left frontal intraparenchymal hemorrhage with intraventricular hemorrhage  2.  Mass effect, brain shift and brain compression  3.  Comatose state  4.  History of alcohol abuse  5.  Thrombocytopenia    POSTOPERATIVE DIAGNOSIS:    1.  Left frontal ntraparenchymal hemorrhage with intraventricular hemorrhage  2.  Mass effect, brain shift and brain compression  3.  Comatose state  4.  History of alcohol abuse  5.  Thrombocytopenia    PROCEDURES PERFORMED:  Left-sided extraventricular drain placement    ATTENDING SURGEON:  Cesar Curry M.D., Ph.D.    ASSISTANT SURGEON:  Delia Stinson M.D., Ph.D.    ANESTHESIA:  General anesthesia    ESTIMATED BLOOD LOSS:  3 mL    COMPLICATIONS:  None    FINDINGS:  Brisk, dark red cerebrospinal fluid egress upon entry into the ventricle    INDICATIONS FOR PROCEDURE:  Mr. Peralta is a 47-year-old gentleman who presented to [facility] ED after being found down by his daughter the morning of 07/06/2022 with left hemiparesis.  CT scan demonstrated a significant right-sided IPH with IVH with a poor neurological exam, including a dilated right pupil.  Mr. Peralta was intubated at the hospital and emergently transferred to [facility] for higher level of care.  He has a history of severe alcohol use disorder and hypertension without taking any medications.  He has a previous history of surgery on his leg that required delayed hospital stay due to reaction to anesthesia. He arrived to [facility] intubated, on a nicardipine drip , and with platelets at 64,000.  He was registered in the ED and emergently taken to the OR for the above procedure.  The patient had the procedure done emergently.    DESCRIPTION OF PROCEDURE:  The patient was emergently brought to the OR.  He was positioned supine with his head on a horseshoe.  Patient was already intubated.  His hair over the left frontal area was removed using a surgical clipper.  The site, left frontal  region, was thoroughly cleaned prepped with ChloraPrep.  The patient was draped in a sterile fashion.  Left side EVD entry point was marked 11 cm superior from the nasion at the midline and 3 cm lateral in line with mid pupillary trajectory.  Lidocaine 5 mL with epinephrine 1:100,000 was injected at the surgical site.  After the site was prepped and marked, a time out was performed confirming the patient's identity, procedure to be performed, site and side of the surgery and the administration of prophylactic preoperative antibiotic.      A #10 blade was used to make a 2 cm incision at the previously identified entry site with good hemostasis.  A hand drill was used to create a 1.5 cm deep bur hole.  A trocar from the Codman catheter kit was used to puncture the dura.  There was dennis bloody CSF egress.  A Codman catheter was advanced in the previously determined trajectory to 6.5 cm deep.  The stylet was pulled out with brisk CSF egress.  The catheter was then soft passed to 7 cm zafar at the skull.  The distal catheter was tunneled posteriorly and medially and with exit through the skin, and it was secured in place with 3-0 Nylon suture.  The entry site incision was reapproximated with 3-0 Nylon suture in a running fashion.  The catheter was then secured to the scalp with staples for strain relief.  It was then hooked up to the Cook drain and set to drain at 10 cm above EAM.      The surgical drapes were removed.  He had an arterial line placed by our Anesthesia colleagues.  Patient remained intubated.  He was then rolled to the CT scanner and then transferred to the ICU directly.    There were no complications with this procedure.     Dr. Perry was present for the entire procedure.      CESAR PERRY MD, PHD    As Dictated by LISETTE MCGARRY MD, PhD      NEUROSURGERY ATTENDING ATTESTATION: Cesar VELAZQUEZ M.D., Ph.D., Neurosurgery Attending, was present and scrubbed for the entire case.      D: 07/06/2022    T: 2022   MT: Fabienne    Name:     KACIE RODRIGUEZ  MRN:      [id number]        Account:        [id number]   :      1975           Procedure Date: 2022     Document: [id number]

## 2022-07-07 NOTE — CONSULTS
[facility]    Consult Note - [facility]      [facility] thanks you for this referral.  Information has been sent to our intake department and currently under review for eligibility.     Please call cell listed below with any questions.      Sarah Martins RN  C [phone]

## 2022-07-07 NOTE — CONSULTS
[facility]  Palliative Care Consultation Note    Patient: Young Peralta  Date of Admission:  7/6/2022    Requesting Clinician / Team: Neurosurgery  Reason for consult: Goals of care    Recommendations:    Family goals are consistent with care focused on comfort only    Compassionate extubation with pre-medication with Midazolam and Fentanyl (ordered)     Post extubation prn comfort medications ordered - midazolam and fentanyl prn.      Addendum: post extubation patient assessed and was comfortable and respirations stable with with regular use of PRN IV fentanyl. There is a plan to transfer outside of ICU on comfort care. In preparation for outside of ICU care I made the following changes to comfort care plan:  - Morphine 5mg sublingual q 4 hours scheduled   -Morphine 5-10mg sublingual q 2 hours prn plus IV morphine 2-4mg q 1 hour prn pain or air hunger  -  lorazepam 1-2mg q 1 hour prn agitation/seizure  Kept IV midazolam available prn as well. Did not switch to IV lorazepam due to shortage of medication    Palliative will continue to follow for support of comfort care management.       These recommendations have been discussed with the primary and ICU teams as well as the bedside nurse.      Thank you for the opportunity to participate in the care of this patient and family. Our team will sign off but don't hesitate to reach out with further questions.     During regular M-F work hours -- if you are not sure who specifically to contact -- please contact us by sending a text page to our team consult pager at [phone].    After regular work hours and on weekends/holidays, you can call our answering service at [phone]. Also, who's on call for us is available in [employer].       Assessments:  Young Peralta is a 47 year old male without known medical history who was found down by his family and who has had suffered a massive intraparenchymal hemorrhage from which no  meaningful recovery is expected by his neurology and neurosurgery teams.     Today, the patient was seen for a goals of care discussion. The patient's decision maker is his daughter who was clear that they do not want continued invasive medical support if there is no meaningful hope of recovery of function. We discussed support around compassionate extubation and will proceed with starting comfort focused care.    Prognosis, Goals, & Planning:      Functional Status just prior to hospitalization: 0 (Fully active, able to carry on all activities without restriction)      Prognosis, Goals, and/or Advance Care Planning were addressed today: Yes        Summary/Comments:       Patient's decision making preferences: unable to assess          Patient has decision-making capacity today for complex decisions: No            I have concerns about the patient/family's health literacy today: No           Patient has a completed Health Care Directive: No.       Code status: No CPR / No Intubation    Coping, Meaning, & Spirituality:   Mood, coping, and/or meaning in the context of serious illness were addressed today: No  Summary/Comments:     Social:     Key family / caregivers: Daughter and her  were present at bedside and are the only family known to us    History of Present Illness:  History gathered today from: family/loved ones    Fabian Vidal is a 46 yo man without known significant past medical history who was transferred from [facility] with large IPH now s/p IVD. He had worsening neurologic exam and this has not improved. His teams feel there is no hope of meaningful recovery. We are involved to discuss goals of care with family in this context.    Key Palliative Symptom Data:  We are not managing pain in this patient        ROS:  Comprehensive ROS is reviewed and is negative except as here & per HPI: Unable to obtain ROS due to obtundation.      Past Medical History:  Unable to obtain medical history due to  "obtundation.      Past Surgical History:  Past Surgical History:   Procedure Laterality Date     VENTRICULOSTOMY Left 7/6/2022    Procedure: VENTRICULOSTOMY, OPEN;  Surgeon: Cesar Curry MD;  Location:  OR         Family History:  Unable to obtain due to mental status.     Allergies:  Allergies   Allergen Reactions     Blood Transfusion Related (Informational Only) Other (See Comments)     Patient has a history of a clinically significant antibody against RBC antigens.  A delay in compatible RBCs may occur.      Sevoflurane      Possible malignant hyperthermia. Patient arrived intubated / encephalopathic.  Difficult to be clear with daughter due to language barrier, even when communicating through , but my understanding was that Young had a leg surgery in the past after which he got \"very hot\" and had muscle weakness afterwards and required an unexpectedly long hospitalization, although he has not had issues with subsequent surgeries     Succinylcholine      Possible malignant hyperthermia. Patient arrived intubated / encephalopathic.  Difficult to be clear with daughter due to language barrier, even when communicating through , but my understanding was that Young had a leg surgery in the past after which he got \"very hot\" and had muscle weakness afterwards and required an unexpectedly long hospitalization, although he has not had issues with subsequent surgeries        Medications:  I have reviewed this patient's medication profile and medications from this hospitalization.     Physical Exam:  Vital Signs: Temp: (!) 100.7  F (38.2  C) Temp src: Axillary BP: 120/89 Pulse: 117   Resp: 20 SpO2: 100 % O2 Device: Mechanical Ventilator    Weight: 125 lbs 3.54 oz    Gen: appears stated age, intubated, no movements, no distress  HEENT: Endotracheally intubated  CV: RRR on monitor  Pulm: CTA, not overbreathing the vent  Neuro: No spontaneous movement, did not respond to voice    Data " reviewed:  Recent imaging reviewed, my comments on pertinents:   CT head demonstrates large left BG hemorrhage with IVH, hydrocephalus and midline shift. There is complete sulcal effacement.     Kalin Solorio  Palliative Care Fellow    Patient seen and evaluated with Dr. Solorio   Agree with assessment and recommendations.    Total time spent was 60 minutes,  >50% of time was spent counseling and/or coordination of care regarding comfort care planning, end of life vent withdrawal plan, goals discussion with family.    Nohelia Alvarado  Palliative Care   Pager [phone]

## 2022-07-07 NOTE — PROGRESS NOTES
Extubation Note    Pt was extubated to comfort care on room air    Successful completion of SBT (Yes or No): yes  Extubation time: 10:35 am    Patient assessment:  Patient tolerance: good     Oxygen device:  RA SpO2: 100%    Plan: will continue with plan of care.     Brook Ponce, RT student

## 2022-07-07 NOTE — DEATH PRONOUNCEMENT
MD DEATH PRONOUNCEMENT    Called to pronounce Htay Fabian dead.    Physical Exam: Unresponsive to noxious stimuli, Spontaneous respirations absent, Breath sounds absent, Carotid pulse absent, Heart sounds absent, Pupillary light reflex absent and Corneal blink reflex absent    Patient was pronounced dead at 4:45:00 PM, 2022.    Preliminary Cause of Death: Right basal ganglia hemorrhage with intraventricular extension with ICH score of 4    Active Problems:    Intraparenchymal hematoma of brain (H)       Infectious disease present?: NO    Communicable disease present? (examples: HIV, chicken pox, TB, Ebola, CJD) :  NO    Multi-drug resistant organism present? (example: MRSA): NO    Please consider an autopsy if any of the following exist:  NO Unexpected or unexplained death during or following any dental, medical, or surgical diagnostic treatment procedures.   NO Death of mother at or up to seven days after delivery.     NO All  and pediatric deaths.     NO Death where the cause is sufficiently obscure to delay completion of the death certificate.   NO Deaths in which autopsy would confirm a suspected illness/condition that would affect surviving family members or recipients of transplanted organs.     The following deaths must be reported to the 's Office:  NO A death that may be due entirely or in part to any factors other than natural disease (recent surgery, recent trauma, suspected abuse/neglect).   NO A death that may be an accident, suicide, or homicide.     NO Any sudden, unexpected death in which there is no prior history of significant heart disease or any other condition associated with sudden death.   NO A death under suspicious, unusual, or unexpected circumstances.    NO Any death which is apparently due to natural causes but in which the  does not have a personal physician familiar with the patient s medical history, social, or environmental situation or the circumstances of  the terminal event.   NO Any death apparently due to Sudden Infant Death Syndrome.     NO Deaths that occur during, in association with, or as consequences of a diagnostic, therapeutic, or anesthetic procedure.   NO Any death in which a fracture of a major bone has occurred within the past (6) six months.   NO A death of persons note seen by their physician within 120 days of demise.     NO Any death in which the  was an inmate of a public institution or was in the custody of Law Enforcement personnel.   NO  All unexpected deaths of children   NO Solid organ donors   NO Unidentified bodies   NO Deaths of persons whose bodies are to be cremated or otherwise disposed of so that the bodies will later be unavailable for examination;   NO Deaths unattended by a physician outside of a licensed healthcare facility or licensed residential hospice program   NO Deaths occurring within 24 hours of arrival to a health care facility if death is unexpected.    NO Deaths associated with the decedent s employment.   NO Deaths attributed to acts of terrorism.   NO Any death in which there is uncertainty as to whether it is a medical examiner s care should be discussed with the medical investigator.        Body disposition: Body released to the  home.    Toro Garcia MD  PGY-1 Neurosurgery  Pager: [phone]

## 2022-07-07 NOTE — PROGRESS NOTES
[facility], [place]  Neurosurgery Progress Note:  07/07/2022      Interval History: Continues with poor exam. Family to come into hospital today. Likely transition to comfort cares today     Assessment:  Patient is a 47M with PMHx severe alcohol use, uncontrolled HTN, who was found down and unresponsive and taken to ED, with CTA demonstrating 6.1 cm acute IPH right basal ganglia with intraventricular extension with mass effect causing leftward 1.3 cm shift for which he was transferred to [facility]. Now s/p emergent left extraventricular drain placement with CT demonstrating worsening right basal ganglia acute ICH with IVH and diffuse effacement of supratentorial sulci and basal cisterns with 16mm right to left subfalcine shift. After return to ICU, discussion with family resulted in patient being DNR. Exam findings consistent with brainstem death however not diagnostic.     Clinically Significant Risk Factors Present on Admission            # Anion Gap Metabolic Acidosis: AG = 22 mmol/L (Ref range: 7 - 15 mmol/L) on admission, will monitor and treat as appropriate    # Thrombocytopenia: Plts = 64 10e3/uL (Ref range: 150 - 450 10e3/uL) on admission, will monitor for bleeding  # Coma: based on GCS score of <8    # Cerebral edema       Pre-operative anticoagulation/antiplatelet:     Plan:  No further neurosurgical intervention at this time  Palliative care consult  Q1 neuro exams   Transfuse 1u platelets  Maintain SBP < 140 using nicardipine, labetalol, and hydralazine as needed  Wean ventilator as tolerated  NPO  Bowel regimen. PRN anti-emetics.  Protonix for ulcer ppx while ventilated  Normonatremia   Electrolyte replacement protocol  Platelets > 100,000  INR < 1.5  DVT: SCDs while in bed  Disposition: ICU     -----------------------------------  Tegan Urbano MD  Neurosurgery resident, PGY-3  -----------------------------------  - intubated and sedated  - 5mm fixed and dilated pupils  bilaterally  - no corneals  - no cough, +gag  - no response to noxious stimuli    Objective:   Temp:  [96.6  F (35.9  C)-98.1  F (36.7  C)] 98.1  F (36.7  C)  Pulse:  [] 108  Resp:  [16-20] 20  BP: (120-222)/() 120/89  MAP:  [55 mmHg-102 mmHg] 90 mmHg  Arterial Line BP: (107-143)/(58-77) 136/66  FiO2 (%):  [30 %-50 %] 30 %  SpO2:  [100 %] 100 %  I/O last 3 completed shifts:  In: 2126.21 [I.V.:2126.21]  Out: 1594 [Urine:1360; Other:231; Blood:3]        LABS:  Recent Labs   Lab 07/06/22  1802 07/06/22  1650 07/06/22  1340     --  136   POTASSIUM 3.3*  --  3.6   CHLORIDE 93*  --  94*   CO2 21*  --  21*   ANIONGAP 22*  --  21*   * 146* 154*   BUN 5.6*  --  6*   CR 0.64*  --  0.69*   CELIA 8.9  --  8.7       Recent Labs   Lab 07/06/22  1340   WBC 8.4   RBC 3.89*   HGB 13.2*   HCT 37.8*   MCV 97   MCH 33.9*   MCHC 34.9   RDW 14.7   PLT 64*       IMAGING:  Recent Results (from the past 24 hour(s))   CTA Head Neck with Contrast    Narrative    HEAD AND NECK CT ANGIOGRAM WITH IV CONTRAST  7/6/2022 1:23 PM    INDICATION: Headache. Found down. Left-sided paralysis. Asymmetric pupils.  TECHNIQUE: Head and neck CT angiogram with IV contrast. Noncontrast head CT followed by axial helical CT images of the head and neck vessels obtained during the arterial phase of intravenous contrast administration. Axial helical 2D reconstructed images   and multiplanar 3D MIP reconstructed images of the head and neck vessels were performed by the technologist. Dose reduction techniques were used.  CONTRAST: IsoVue 370 75mL.  COMPARISON: None.     FINDINGS:  NONCONTRAST HEAD CT: There is a large hyperdense acute parenchymal hemorrhage centered within the right basal ganglia that measures 6.1 cm transverse x 4.1 cm AP x 4.4 cm craniocaudal. Hemorrhage extends throughout the ventricular system. There is mass   effect with 1.3 cm of leftward shift of the midline structures and displacement of the third ventricle. There is  mild lateral ventricle hydrocephalus. Osseous structures are intact. Mild to moderate mucosal thickening in the maxillary sinuses. Clear   mastoids and middle ears. Unremarkable orbits.     HEAD CTA: There is mildly irregular linear contrast scattered throughout the right basal ganglia hemorrhage. Mild to moderate left carotid siphon atherosclerotic disease. No proximal large vessel occlusion. Patent dural venous sinuses.    NECK CTA: Three vessel arch.  Carotid arteries are patent with mild atherosclerotic change.  No hemodynamically significant stenosis by NASCET criteria in either carotid system.  Patent vertebral arteries. No dissection.      Impression    CONCLUSION:  HEAD CT:  1.  6.1 cm acute intraparenchymal hemorrhage centered in the right basal ganglia.  2.  Intraventricular hemorrhage in combination with mass effect causes mild hydrocephalus.  3.  Mass effect causes 1.3 cm of leftward midline shift/subfalcine herniation.    HEAD CTA:  1.  Irregular linear contrast enhancement within the right basal ganglia hemorrhage indicates at least a component of active presumably arterial bleeding. Underlying vascular lesion such as an AVM is a consideration.    NECK CTA:  1.  Mild carotid artery atherosclerosis. No dissection or hemodynamically significant narrowing in the neck by NASCET criteria.    Noncontrast head CT findings were discussed with Dr. Butler via telephone at 1333 hours, and CTA results of active bleeding were called to the ER at 1342 hours on 7/6/2022.     CT Head w/o Contrast    Narrative    CT HEAD W/O CONTRAST 7/6/2022 4:39 PM    History: s/p emergent EVD placement left side    Comparison:    Technique:  Using multidetector thin collimation helical acquisition  technique, axial, coronal and sagittal CT images from the skull base  to the vertex were obtained.    Findings: Left frontal approach ventriculostomy catheter placement new  compared with prior. Right basal ganglia hemorrhage opening to  the  ventricular system. Increased volume intraventricular bleeding.  Increased size of lateral ventricles, third ventricle and fourth  ventricle. Diffuse effacement of cerebral sulci due to mass effect.  Hemorrhage also extends to right cerebral peduncle, mesencephalon and  superior betty. 16 mm right to left subfalcine shift. Near complete  effacement of basal cisterns.    No skull fracture. Grossly normal soft tissues. Expected  pneumocephalus due to ventriculostomy placement.      Impression    Impression: Right basal ganglia acute intracranial hemorrhage  extending to right cerebral peduncle and mesencephalon. Opening into  the ventricular system with increased size of ventricular system since  earlier CT status post left frontal ventriculostomy placement. Diffuse  effacement of the supratentorial sulci and basal cisterns with 16 mm  right-to-left subfalcine shift.         TIESHA BLAND MD         SYSTEM ID:  [id number]   XR Chest Port 1 View    Narrative    EXAM: XR CHEST PORT 1 VIEW  7/6/2022 5:35 PM     HISTORY:  47 year old male with ICH, intubated, CXR to assess for tube  placement       COMPARISON:  None    TECHNIQUE: AP view the chest at 30 degrees    FINDINGS:   Devices, lines, tubes: Endotracheal tube tip projecting 5.5 cm  superior to adriana.    The trachea is midline. The cardiomediastinal silhouette is within  normal limits. The pulmonary vasculature is distinct.  No appreciable  pneumothorax, pleural effusion, or focal consolidative opacity. No  acute osseous abnormality.       Impression    IMPRESSION:   Endotracheal tube tip projecting 5.5 cm superior to the adriana.    I have personally reviewed the examination and initial interpretation  and I agree with the findings.    HUMBERTO LUNA MD         SYSTEM ID:  [id number]

## 2022-07-07 NOTE — PLAN OF CARE
Goal Outcome Evaluation:    Plan of Care Reviewed With: daughter, family     Overall Patient Progress: declining    Patient arrived directly from OR at approximately 1630 this afternoon.    Neuro: Unresponsive, does not withdraw to pain, pupils fixed and uneven in shape, cough and gag reflex not intact. EVD at 10 cm above EAM with dark red blood draining within the drainage system. Opening pressure on arrival to ICU was 31 mmHg. ICPs have ranged from 6-31 mmHg and drainage is as charted.  CV: ST with SBP goal less than 140 mmHg.  Pulm: ETT and on CMV setting with RR of 20, FiO2 of 30%, PEEP of 5, and Vt of 350. Lung sounds clear.  GI: NPO. No BM this shift.  : Sheffield catheter in place. See charting for volume.  IV/Gtt: PIV x3 and left mid radial arterial line. MIV at 125 ml/hr and Cardene at charted rate.    Will continue to monitor for safety and comfort.    Fish Layne RN

## 2022-07-07 NOTE — DISCHARGE SUMMARY
[facility] Discharge Summary and Instructions    Htay Fabian MRN# [id number]   Age: 47 year old YOB: 1975     Date of Admission:  7/6/2022  Date of Discharge::  7/7/2022  Admitting Physician:  Cesar Curry MD  Discharge Physician:  Cesar Curry MD          Admission Diagnoses:   Brain bleed (H) [I61.9]  Intraparenchymal hematoma of brain (H) [S06.360A]          Discharge Diagnosis:     Brain bleed (H) [I61.9]  Intraparenchymal hematoma of brain (H) [S06.360A]          Procedures:    Left frontal External ventricular drain placement 7/6/2022           Brief History of Illness:   Patient is a 47M who was found down and unresponsive with acute IPH right basal ganglia with intraventricular extension with mass effect. He underwent emergent left frontal extraventricular drain placement but with worsening hemorrhage. Given the poor prognosis, the family wanted comfort care measures. Patient was pronounced dead at 4:45:00 PM, July 7, 2022.         Hospital Course:   Patient is a 47M with PMHx severe alcohol use, uncontrolled HTN, who was found down and unresponsive and taken to ED, with CTA demonstrating 6.1 cm acute IPH right basal ganglia with intraventricular extension with mass effect causing leftward 1.3 cm shift for which he was transferred to [facility]. He underwent emergent left frontal extraventricular drain placement with CT demonstrating worsening right basal ganglia acute ICH with IVH and diffuse effacement of supratentorial sulci and basal cisterns with 16mm right to left subfalcine shift. After return to ICU, discussion with family resulted in patient being DNR. Exam findings consistent with brainstem death however not diagnostic.    Care conference was held and family wanted DNR and comfort care with compassionate extubation with premedication. Per their request, the patient was transitioned to comfort care, EVD was removed and supportive care was withdrawn.  At 4:45:00 PM,  2022, the patient was found with no vital signs, unresponsive to noxious stimuli, Spontaneous respirations absent, Breath sounds absent, Carotid pulse absent, Heart sounds absent, Pupillary light reflex absent and Corneal blink reflex absent. He was examined and pronounced dead at 4:45:00 PM, 2022. Family was immediately updated regarding the same.    Exam at discharge:    Unresponsive to noxious stimuli, Spontaneous respirations absent, Breath sounds absent, Carotid pulse absent, Heart sounds absent, Pupillary light reflex absent and Corneal blink reflex absent         Discharge Disposition:     Patient  during this admission        Toro Garcia   Neurosurgery Resident  PGY1    Delia Stinson MD, PhD  PGY-2    Please contact neurosurgery resident on call with questions.    Dial * * [phone], enter [phone] when prompted.

## 2022-07-07 NOTE — CONSULTS
[facility]    Consult Note - [facility]    ______________________________________________________________________    [facility] 24/7 Contact Number: [phone]    ______________________________________________________________________        Young is currently ineligible for inpatient hospice.    Rationale: no unmanaged symptoms per Dr. Young    Body is relaxed with RR = 20-22.  No signs of distress/nonverbal pain.  Collaborated with ICU RN Fish and spoke to Dr. Young by phone to discuss.      Please call cell listed below for any questions or acute changes in condition to reassess for eligibility.    Ineligibility Status Discussed with the Following:   - Nurse: Fish  - Hospitalist/Rounding Provider: Delia Stinson    - Hospice Provider: Dr. Young  - Hospice Social Worker: Kaitlynn    Is Young eligible for hospice on Discharge? yes    Sarah Martins RN  C [phone]

## 2022-07-08 NOTE — PLAN OF CARE
Goal Outcome Evaluation:    Plan of Care Reviewed With: patient, daughter     Overall Patient Progress: declining    Patient palliatively extubated this morning at approximately 1030. Patient assessed frequently for distress and dosed accordingly with PRN medications for symptom management of respiratory hunger. Patient  at 1645 this afternoon. Family updated via  services line.    Fish Layne RN

## (undated) DEVICE — PACK NEURO MINOR UMMC SNE32MNMU4

## (undated) DEVICE — PAD CHUX UNDERPAD 23X24" 7136

## (undated) DEVICE — CATH TRAY FOLEY SURESTEP 16FR W/URNE MTR STLK LATEX A303316A

## (undated) DEVICE — SYR 10ML FINGER CONTROL W/O NDL 309695

## (undated) DEVICE — PREP SKIN SCRUB TRAY 4461A

## (undated) DEVICE — DECANTER VIAL 2006S

## (undated) DEVICE — SU ETHILON 3-0 PS-1 18" 1663H

## (undated) DEVICE — STPL SKIN 35W ROTATING HEAD PRW35

## (undated) DEVICE — DRAPE U SPLIT 74X120" 29440

## (undated) DEVICE — SUCTION MANIFOLD NEPTUNE 2 SYS 4 PORT 0702-020-000

## (undated) DEVICE — DRAIN SYSTEM CSF EXTERNAL DRAINAGE VENTRIC/LUMBAR 46914

## (undated) DEVICE — SOL NACL 0.9% IRRIG 1000ML BOTTLE 2F7124

## (undated) DEVICE — PREP POVIDONE IODINE SOLUTION 10% 4OZ BOTTLE 29906-004

## (undated) DEVICE — LINEN TOWEL PACK X5 5464

## (undated) DEVICE — PREP POVIDONE-IODINE 7.5% SCRUB 4OZ BOTTLE MDS093945

## (undated) DEVICE — GLOVE PROTEXIS BLUE W/NEU-THERA 8.0  2D73EB80

## (undated) DEVICE — DRAPE IOBAN INCISE 13X13" 6640EZ

## (undated) DEVICE — SYR 10ML LL W/O NDL 302995

## (undated) DEVICE — Device

## (undated) DEVICE — PREP CHLORAPREP CLEAR 3ML 930400

## (undated) DEVICE — DRAPE CRANIOTOMY W/POUCH 9450

## (undated) DEVICE — SOL WATER IRRIG 1000ML BOTTLE 2F7114

## (undated) DEVICE — GLOVE PROTEXIS MICRO 7.5  2D73PM75

## (undated) DEVICE — DRSG PRIMAPORE 03 1/8X6" 66000318

## (undated) DEVICE — SU VICRYL 3-0 SH 8X18" UND J864D

## (undated) DEVICE — DRSG GAUZE 4X4" TRAY 6939

## (undated) DEVICE — LINEN TOWEL PACK X6 WHITE 5487

## (undated) DEVICE — SPONGE SURGIFOAM 100 1974

## (undated) DEVICE — ESU GROUND PAD ADULT W/CORD E7507

## (undated) DEVICE — RX SURGIFLO HEMOSTATIC MATRIX W/THROMBIN 8ML 2994

## (undated) DEVICE — DRAPE SHEET REV FOLD 3/4 9349

## (undated) DEVICE — SPONGE SURGIFOAM 12 1972

## (undated) DEVICE — SU SILK 2-0 TIE 12X30" A305H

## (undated) RX ORDER — LIDOCAINE HYDROCHLORIDE AND EPINEPHRINE 10; 10 MG/ML; UG/ML
INJECTION, SOLUTION INFILTRATION; PERINEURAL
Status: DISPENSED
Start: 2022-01-01

## (undated) RX ORDER — FENTANYL CITRATE 50 UG/ML
INJECTION, SOLUTION INTRAMUSCULAR; INTRAVENOUS
Status: DISPENSED
Start: 2022-01-01